# Patient Record
Sex: MALE | Race: BLACK OR AFRICAN AMERICAN | NOT HISPANIC OR LATINO | Employment: FULL TIME | URBAN - METROPOLITAN AREA
[De-identification: names, ages, dates, MRNs, and addresses within clinical notes are randomized per-mention and may not be internally consistent; named-entity substitution may affect disease eponyms.]

---

## 2021-06-18 ENCOUNTER — TELEPHONE (OUTPATIENT)
Dept: DIABETES | Facility: CLINIC | Age: 21
End: 2021-06-18

## 2021-08-17 ENCOUNTER — CLINICAL SUPPORT (OUTPATIENT)
Dept: DIABETES | Facility: CLINIC | Age: 21
End: 2021-08-17
Payer: MEDICAID

## 2021-08-17 DIAGNOSIS — E11.9 DIABETES MELLITUS, NEW ONSET: ICD-10-CM

## 2021-08-17 PROCEDURE — 99212 OFFICE O/P EST SF 10 MIN: CPT | Mod: PBBFAC,PN | Performed by: DIETITIAN, REGISTERED

## 2021-08-17 PROCEDURE — 99999 PR PBB SHADOW E&M-EST. PATIENT-LVL II: ICD-10-PCS | Mod: PBBFAC,,, | Performed by: DIETITIAN, REGISTERED

## 2021-08-17 PROCEDURE — G0108 DIAB MANAGE TRN  PER INDIV: HCPCS | Mod: PBBFAC,PN | Performed by: DIETITIAN, REGISTERED

## 2021-08-17 PROCEDURE — 99999 PR PBB SHADOW E&M-EST. PATIENT-LVL II: CPT | Mod: PBBFAC,,, | Performed by: DIETITIAN, REGISTERED

## 2021-08-26 ENCOUNTER — OFFICE VISIT (OUTPATIENT)
Dept: PRIMARY CARE CLINIC | Facility: CLINIC | Age: 21
End: 2021-08-26
Payer: MEDICAID

## 2021-08-26 VITALS
DIASTOLIC BLOOD PRESSURE: 64 MMHG | WEIGHT: 256.06 LBS | SYSTOLIC BLOOD PRESSURE: 144 MMHG | TEMPERATURE: 98 F | BODY MASS INDEX: 37.93 KG/M2 | HEIGHT: 69 IN | HEART RATE: 65 BPM | OXYGEN SATURATION: 100 %

## 2021-08-26 DIAGNOSIS — N48.89 IRRITATION OF PENIS: ICD-10-CM

## 2021-08-26 DIAGNOSIS — R03.0 ELEVATED BLOOD PRESSURE READING: ICD-10-CM

## 2021-08-26 DIAGNOSIS — E11.65 TYPE 2 DIABETES MELLITUS WITH HYPERGLYCEMIA, WITHOUT LONG-TERM CURRENT USE OF INSULIN: ICD-10-CM

## 2021-08-26 DIAGNOSIS — Z76.89 ENCOUNTER TO ESTABLISH CARE: Primary | ICD-10-CM

## 2021-08-26 DIAGNOSIS — E66.9 OBESITY (BMI 30-39.9): ICD-10-CM

## 2021-08-26 DIAGNOSIS — Z11.4 ENCOUNTER FOR SCREENING FOR HIV: ICD-10-CM

## 2021-08-26 DIAGNOSIS — Z11.59 NEED FOR HEPATITIS C SCREENING TEST: ICD-10-CM

## 2021-08-26 PROCEDURE — 99999 PR PBB SHADOW E&M-EST. PATIENT-LVL IV: CPT | Mod: PBBFAC,,, | Performed by: STUDENT IN AN ORGANIZED HEALTH CARE EDUCATION/TRAINING PROGRAM

## 2021-08-26 PROCEDURE — 99214 OFFICE O/P EST MOD 30 MIN: CPT | Mod: PBBFAC,PN | Performed by: STUDENT IN AN ORGANIZED HEALTH CARE EDUCATION/TRAINING PROGRAM

## 2021-08-26 PROCEDURE — 99999 PR PBB SHADOW E&M-EST. PATIENT-LVL IV: ICD-10-PCS | Mod: PBBFAC,,, | Performed by: STUDENT IN AN ORGANIZED HEALTH CARE EDUCATION/TRAINING PROGRAM

## 2021-08-26 PROCEDURE — 99204 PR OFFICE/OUTPT VISIT, NEW, LEVL IV, 45-59 MIN: ICD-10-PCS | Mod: S$PBB,,, | Performed by: STUDENT IN AN ORGANIZED HEALTH CARE EDUCATION/TRAINING PROGRAM

## 2021-08-26 PROCEDURE — 99204 OFFICE O/P NEW MOD 45 MIN: CPT | Mod: S$PBB,,, | Performed by: STUDENT IN AN ORGANIZED HEALTH CARE EDUCATION/TRAINING PROGRAM

## 2021-08-26 RX ORDER — INSULIN PUMP SYRINGE, 3 ML
EACH MISCELLANEOUS
Qty: 1 EACH | Refills: 0 | Status: SHIPPED | OUTPATIENT
Start: 2021-08-26 | End: 2022-04-22 | Stop reason: SDUPTHER

## 2021-08-26 RX ORDER — LANCETS
EACH MISCELLANEOUS
Qty: 100 EACH | Refills: 5 | Status: SHIPPED | OUTPATIENT
Start: 2021-08-26

## 2021-08-26 RX ORDER — METFORMIN HYDROCHLORIDE 500 MG/1
1000 TABLET ORAL
Qty: 180 TABLET | Refills: 3 | Status: SHIPPED | OUTPATIENT
Start: 2021-08-26 | End: 2023-01-11 | Stop reason: SDUPTHER

## 2021-08-26 RX ORDER — HYDROCORTISONE 25 MG/G
CREAM TOPICAL 2 TIMES DAILY
Qty: 20 G | Refills: 0 | Status: SHIPPED | OUTPATIENT
Start: 2021-08-26 | End: 2021-09-21 | Stop reason: SDUPTHER

## 2021-09-21 ENCOUNTER — OFFICE VISIT (OUTPATIENT)
Dept: PRIMARY CARE CLINIC | Facility: CLINIC | Age: 21
End: 2021-09-21
Payer: MEDICAID

## 2021-09-21 VITALS
OXYGEN SATURATION: 99 % | WEIGHT: 247.13 LBS | SYSTOLIC BLOOD PRESSURE: 138 MMHG | TEMPERATURE: 99 F | BODY MASS INDEX: 36.6 KG/M2 | DIASTOLIC BLOOD PRESSURE: 64 MMHG | HEIGHT: 69 IN | HEART RATE: 73 BPM

## 2021-09-21 DIAGNOSIS — E11.65 TYPE 2 DIABETES MELLITUS WITH HYPERGLYCEMIA, WITHOUT LONG-TERM CURRENT USE OF INSULIN: ICD-10-CM

## 2021-09-21 DIAGNOSIS — E78.5 HYPERLIPIDEMIA, UNSPECIFIED HYPERLIPIDEMIA TYPE: ICD-10-CM

## 2021-09-21 DIAGNOSIS — N48.89 IRRITATION OF PENIS: ICD-10-CM

## 2021-09-21 DIAGNOSIS — L30.9 DERMATITIS: Primary | ICD-10-CM

## 2021-09-21 DIAGNOSIS — E66.9 OBESITY (BMI 30-39.9): ICD-10-CM

## 2021-09-21 PROCEDURE — 99999 PR PBB SHADOW E&M-EST. PATIENT-LVL III: ICD-10-PCS | Mod: PBBFAC,,, | Performed by: STUDENT IN AN ORGANIZED HEALTH CARE EDUCATION/TRAINING PROGRAM

## 2021-09-21 PROCEDURE — 99214 OFFICE O/P EST MOD 30 MIN: CPT | Mod: S$PBB,,, | Performed by: STUDENT IN AN ORGANIZED HEALTH CARE EDUCATION/TRAINING PROGRAM

## 2021-09-21 PROCEDURE — 99999 PR PBB SHADOW E&M-EST. PATIENT-LVL III: CPT | Mod: PBBFAC,,, | Performed by: STUDENT IN AN ORGANIZED HEALTH CARE EDUCATION/TRAINING PROGRAM

## 2021-09-21 PROCEDURE — 99214 PR OFFICE/OUTPT VISIT, EST, LEVL IV, 30-39 MIN: ICD-10-PCS | Mod: S$PBB,,, | Performed by: STUDENT IN AN ORGANIZED HEALTH CARE EDUCATION/TRAINING PROGRAM

## 2021-09-21 PROCEDURE — 99213 OFFICE O/P EST LOW 20 MIN: CPT | Mod: PBBFAC,PN | Performed by: STUDENT IN AN ORGANIZED HEALTH CARE EDUCATION/TRAINING PROGRAM

## 2021-09-21 RX ORDER — HYDROCORTISONE 25 MG/G
CREAM TOPICAL 2 TIMES DAILY
Qty: 20 G | Refills: 0 | Status: SHIPPED | OUTPATIENT
Start: 2021-09-21 | End: 2021-09-21

## 2021-09-21 RX ORDER — LANCETS 33 GAUGE
EACH MISCELLANEOUS 2 TIMES DAILY
COMMUNITY
Start: 2021-08-26

## 2021-09-21 RX ORDER — CHLORHEXIDINE GLUCONATE 40 MG/ML
SOLUTION TOPICAL DAILY PRN
Qty: 237 ML | Refills: 0
Start: 2021-09-21 | End: 2022-04-22

## 2021-09-21 RX ORDER — HYDROCORTISONE 25 MG/G
CREAM TOPICAL
Qty: 60 G | Refills: 1 | Status: SHIPPED | OUTPATIENT
Start: 2021-09-21 | End: 2022-04-22

## 2021-09-21 RX ORDER — MUPIROCIN 20 MG/G
OINTMENT TOPICAL
Qty: 15 G | Refills: 0 | Status: SHIPPED | OUTPATIENT
Start: 2021-09-21 | End: 2022-04-22

## 2021-09-21 RX ORDER — ATORVASTATIN CALCIUM 10 MG/1
10 TABLET, FILM COATED ORAL DAILY
Qty: 90 TABLET | Refills: 3 | Status: SHIPPED | OUTPATIENT
Start: 2021-09-21 | End: 2022-08-11

## 2022-04-10 DIAGNOSIS — E11.65 TYPE 2 DIABETES MELLITUS WITH HYPERGLYCEMIA, WITHOUT LONG-TERM CURRENT USE OF INSULIN: ICD-10-CM

## 2022-04-10 NOTE — TELEPHONE ENCOUNTER
Refill Routing Note   Medication(s) are not appropriate for processing by [facility] for the following reason(s):      - Patient has been seen in the ED/Hospital since the last PCP visit    [facility] action(s):  Defer          Medication reconciliation completed: No     Appointments  past 12m or future 3m with PCP    Date Provider   Last Visit   9/21/2021 Yair Jessica MD   Next Visit   Visit date not found Yair Jessica MD   ED visits in past 90 days: 0        Note composed:1:06 PM 04/10/2022

## 2022-04-10 NOTE — TELEPHONE ENCOUNTER
No new care gaps identified.  Powered by [employer]. Reference number: [id number].   4/10/2022 4:12:44 AM CDT

## 2022-04-11 RX ORDER — EMPAGLIFLOZIN 10 MG/1
TABLET, FILM COATED ORAL
Qty: 90 TABLET | Refills: 1 | Status: SHIPPED | OUTPATIENT
Start: 2022-04-11 | End: 2022-04-22

## 2022-04-22 ENCOUNTER — OFFICE VISIT (OUTPATIENT)
Dept: PRIMARY CARE CLINIC | Facility: CLINIC | Age: 22
End: 2022-04-22
Payer: COMMERCIAL

## 2022-04-22 ENCOUNTER — TELEPHONE (OUTPATIENT)
Dept: PODIATRY | Facility: CLINIC | Age: 22
End: 2022-04-22
Payer: COMMERCIAL

## 2022-04-22 VITALS
TEMPERATURE: 98 F | SYSTOLIC BLOOD PRESSURE: 140 MMHG | BODY MASS INDEX: 37.84 KG/M2 | DIASTOLIC BLOOD PRESSURE: 70 MMHG | OXYGEN SATURATION: 97 % | RESPIRATION RATE: 16 BRPM | HEART RATE: 51 BPM | HEIGHT: 69 IN | WEIGHT: 255.5 LBS

## 2022-04-22 DIAGNOSIS — L60.0 INGROWN TOENAIL OF RIGHT FOOT: ICD-10-CM

## 2022-04-22 DIAGNOSIS — E11.65 TYPE 2 DIABETES MELLITUS WITH HYPERGLYCEMIA, WITHOUT LONG-TERM CURRENT USE OF INSULIN: Primary | ICD-10-CM

## 2022-04-22 DIAGNOSIS — I10 HYPERTENSION, UNSPECIFIED TYPE: ICD-10-CM

## 2022-04-22 PROCEDURE — 99999 PR PBB SHADOW E&M-EST. PATIENT-LVL V: CPT | Mod: PBBFAC,,, | Performed by: STUDENT IN AN ORGANIZED HEALTH CARE EDUCATION/TRAINING PROGRAM

## 2022-04-22 PROCEDURE — 99215 OFFICE O/P EST HI 40 MIN: CPT | Mod: PBBFAC,PN | Performed by: STUDENT IN AN ORGANIZED HEALTH CARE EDUCATION/TRAINING PROGRAM

## 2022-04-22 PROCEDURE — 3044F HG A1C LEVEL LT 7.0%: CPT | Mod: CPTII,S$GLB,, | Performed by: STUDENT IN AN ORGANIZED HEALTH CARE EDUCATION/TRAINING PROGRAM

## 2022-04-22 PROCEDURE — 1159F MED LIST DOCD IN RCRD: CPT | Mod: CPTII,S$GLB,, | Performed by: STUDENT IN AN ORGANIZED HEALTH CARE EDUCATION/TRAINING PROGRAM

## 2022-04-22 PROCEDURE — 3008F BODY MASS INDEX DOCD: CPT | Mod: CPTII,S$GLB,, | Performed by: STUDENT IN AN ORGANIZED HEALTH CARE EDUCATION/TRAINING PROGRAM

## 2022-04-22 PROCEDURE — 3044F PR MOST RECENT HEMOGLOBIN A1C LEVEL <7.0%: ICD-10-PCS | Mod: CPTII,S$GLB,, | Performed by: STUDENT IN AN ORGANIZED HEALTH CARE EDUCATION/TRAINING PROGRAM

## 2022-04-22 PROCEDURE — 3078F DIAST BP <80 MM HG: CPT | Mod: CPTII,S$GLB,, | Performed by: STUDENT IN AN ORGANIZED HEALTH CARE EDUCATION/TRAINING PROGRAM

## 2022-04-22 PROCEDURE — 1160F PR REVIEW ALL MEDS BY PRESCRIBER/CLIN PHARMACIST DOCUMENTED: ICD-10-PCS | Mod: CPTII,S$GLB,, | Performed by: STUDENT IN AN ORGANIZED HEALTH CARE EDUCATION/TRAINING PROGRAM

## 2022-04-22 PROCEDURE — 1160F RVW MEDS BY RX/DR IN RCRD: CPT | Mod: CPTII,S$GLB,, | Performed by: STUDENT IN AN ORGANIZED HEALTH CARE EDUCATION/TRAINING PROGRAM

## 2022-04-22 PROCEDURE — 4010F ACE/ARB THERAPY RXD/TAKEN: CPT | Mod: CPTII,S$GLB,, | Performed by: STUDENT IN AN ORGANIZED HEALTH CARE EDUCATION/TRAINING PROGRAM

## 2022-04-22 PROCEDURE — 99999 PR PBB SHADOW E&M-EST. PATIENT-LVL V: ICD-10-PCS | Mod: PBBFAC,,, | Performed by: STUDENT IN AN ORGANIZED HEALTH CARE EDUCATION/TRAINING PROGRAM

## 2022-04-22 PROCEDURE — 99214 PR OFFICE/OUTPT VISIT, EST, LEVL IV, 30-39 MIN: ICD-10-PCS | Mod: S$GLB,,, | Performed by: STUDENT IN AN ORGANIZED HEALTH CARE EDUCATION/TRAINING PROGRAM

## 2022-04-22 PROCEDURE — 3078F PR MOST RECENT DIASTOLIC BLOOD PRESSURE < 80 MM HG: ICD-10-PCS | Mod: CPTII,S$GLB,, | Performed by: STUDENT IN AN ORGANIZED HEALTH CARE EDUCATION/TRAINING PROGRAM

## 2022-04-22 PROCEDURE — 4010F PR ACE/ARB THEARPY RXD/TAKEN: ICD-10-PCS | Mod: CPTII,S$GLB,, | Performed by: STUDENT IN AN ORGANIZED HEALTH CARE EDUCATION/TRAINING PROGRAM

## 2022-04-22 PROCEDURE — 3077F PR MOST RECENT SYSTOLIC BLOOD PRESSURE >= 140 MM HG: ICD-10-PCS | Mod: CPTII,S$GLB,, | Performed by: STUDENT IN AN ORGANIZED HEALTH CARE EDUCATION/TRAINING PROGRAM

## 2022-04-22 PROCEDURE — 3008F PR BODY MASS INDEX (BMI) DOCUMENTED: ICD-10-PCS | Mod: CPTII,S$GLB,, | Performed by: STUDENT IN AN ORGANIZED HEALTH CARE EDUCATION/TRAINING PROGRAM

## 2022-04-22 PROCEDURE — 99214 OFFICE O/P EST MOD 30 MIN: CPT | Mod: S$GLB,,, | Performed by: STUDENT IN AN ORGANIZED HEALTH CARE EDUCATION/TRAINING PROGRAM

## 2022-04-22 PROCEDURE — 3077F SYST BP >= 140 MM HG: CPT | Mod: CPTII,S$GLB,, | Performed by: STUDENT IN AN ORGANIZED HEALTH CARE EDUCATION/TRAINING PROGRAM

## 2022-04-22 PROCEDURE — 1159F PR MEDICATION LIST DOCUMENTED IN MEDICAL RECORD: ICD-10-PCS | Mod: CPTII,S$GLB,, | Performed by: STUDENT IN AN ORGANIZED HEALTH CARE EDUCATION/TRAINING PROGRAM

## 2022-04-22 RX ORDER — INSULIN PUMP SYRINGE, 3 ML
EACH MISCELLANEOUS
Qty: 1 EACH | Refills: 0 | Status: SHIPPED | OUTPATIENT
Start: 2022-04-22 | End: 2023-04-22

## 2022-04-22 RX ORDER — CLOTRIMAZOLE AND BETAMETHASONE DIPROPIONATE 10; .64 MG/G; MG/G
CREAM TOPICAL 2 TIMES DAILY
COMMUNITY
Start: 2022-04-12 | End: 2022-08-11

## 2022-04-22 RX ORDER — LOSARTAN POTASSIUM 25 MG/1
25 TABLET ORAL DAILY
Qty: 90 TABLET | Refills: 3 | Status: SHIPPED | OUTPATIENT
Start: 2022-04-22 | End: 2022-08-11

## 2022-04-22 NOTE — PROGRESS NOTES
Subjective:           Patient ID: Teresa Albarado is a 22 y.o. male who presents today with a chief complaint of follow-up diabetes.    Chief Complaint:   No chief complaint on file.      History of Present Illness:    22-year-old male presenting to clinic today to follow-up on diabetes.    Patient currently taking metformin twice a day, not taking his Jardiance at this time.  Also states he does not have a glucometer currently.  At last appointment patient had mentioned that he did not have a glucometer due to the pharmacy being out of them.    Recurrent ingrown toenails.  Has occurred again on the right foot.   Tender to palpation, has been having his mom soak the foot.   Interested in seeing podiatry.     Review of Systems   Constitutional: Negative.  Negative for chills and fever.   HENT: Negative.  Negative for congestion, rhinorrhea, sinus pressure and sinus pain.    Eyes: Negative.    Respiratory: Negative.  Negative for chest tightness, shortness of breath and wheezing.    Cardiovascular: Negative.  Negative for chest pain, palpitations and leg swelling.   Gastrointestinal: Negative.  Negative for abdominal distention, constipation, diarrhea, nausea and vomiting.   Endocrine: Negative.  Negative for polydipsia and polyuria.   Genitourinary: Negative for difficulty urinating, frequency, penile pain and urgency. Penile discharge: itching.   Musculoskeletal: Negative.  Negative for arthralgias.   Skin: Positive for color change and rash.        Irritation to elbows and thighs with itchyness.   Allergic/Immunologic: Negative for food allergies.   Neurological: Positive for headaches. Negative for dizziness, light-headedness and numbness.   Psychiatric/Behavioral: Negative.  The patient is not nervous/anxious.            Objective:        Vitals:    04/22/22 0851 04/22/22 0956   BP: (!) 140/68 (!) 140/70   BP Location: Right arm Right arm   Patient Position: Sitting Sitting   BP Method: Large (Manual) Large  "(Manual)   Pulse: (!) 51    Resp: 16    Temp: 98.4 °F (36.9 °C)    TempSrc: Oral    SpO2: 97%    Weight: 115.9 kg (255 lb 8.2 oz)    Height: 5' 9" (1.753 m)        Body mass index is 37.73 kg/m².            Physical Exam  Vitals reviewed.   Constitutional:       General: He is not in acute distress.     Appearance: Normal appearance. He is not ill-appearing.      Comments: As per BMI.   HENT:      Head: Normocephalic and atraumatic.      Right Ear: External ear normal.      Left Ear: External ear normal.      Nose: Nose normal.   Eyes:      Extraocular Movements: Extraocular movements intact.      Conjunctiva/sclera: Conjunctivae normal.   Cardiovascular:      Rate and Rhythm: Normal rate.   Pulmonary:      Effort: Pulmonary effort is normal. No respiratory distress.   Musculoskeletal:         General: No swelling or deformity.      Cervical back: Normal range of motion.      Comments: DM foot exam reveals an ingrown right great toe nail as imaged.  Some local inflammation and irritation, but no surrounding cellulitis.      Patient sensation is intact to 8 points on feet bilaterally.  No ulcers.  Pulses intact.   Neurological:      General: No focal deficit present.      Mental Status: He is alert and oriented to person, place, and time.      Gait: Gait normal.   Psychiatric:         Mood and Affect: Mood normal.                 Lab Results   Component Value Date     03/11/2022    K 4.3 03/11/2022     03/11/2022    CO2 23 03/11/2022    BUN 14 03/11/2022    CREATININE 0.9 03/11/2022    ANIONGAP 9 03/11/2022     Lab Results   Component Value Date    HGBA1C 5.8 (H) 03/11/2022     No results found for: BNP, BNPTRIAGEBLO    Lab Results   Component Value Date    WBC 6.50 07/16/2021    HGB 16.0 07/16/2021    HCT 49.6 07/16/2021     07/16/2021    GRAN 3.1 07/16/2021    GRAN 47.0 07/16/2021     Lab Results   Component Value Date    CHOL 201 (H) 08/26/2021    HDL 39 (L) 08/26/2021    LDLCALC 147.4 " 08/26/2021    TRIG 73 08/26/2021          Current Outpatient Medications:     atorvastatin (LIPITOR) 10 MG tablet, Take 1 tablet (10 mg total) by mouth once daily., Disp: 90 tablet, Rfl: 3    blood sugar diagnostic Strp, To check BG 2 times daily, to use with insurance preferred meter, Disp: 100 strip, Rfl: 5    clotrimazole-betamethasone 1-0.05% (LOTRISONE) cream, Apply topically 2 (two) times daily., Disp: , Rfl:     lancets Misc, To check BG 2 times daily, to use with insurance preferred meter, Disp: 100 each, Rfl: 5    metFORMIN (GLUCOPHAGE) 500 MG tablet, Take 2 tablets (1,000 mg total) by mouth daily with breakfast. (Patient taking differently: Take 1,000 mg by mouth 2 (two) times daily with meals.), Disp: 180 tablet, Rfl: 3    TRUEPLUS LANCETS 33 gauge Misc, Apply topically 2 (two) times daily., Disp: , Rfl:     blood-glucose meter kit, To check BG 2 times daily, to use with insurance preferred meter, Disp: 1 each, Rfl: 0    losartan (COZAAR) 25 MG tablet, Take 1 tablet (25 mg total) by mouth once daily., Disp: 90 tablet, Rfl: 3     Outpatient Encounter Medications as of 4/22/2022   Medication Sig Dispense Refill    atorvastatin (LIPITOR) 10 MG tablet Take 1 tablet (10 mg total) by mouth once daily. 90 tablet 3    blood sugar diagnostic Strp To check BG 2 times daily, to use with insurance preferred meter 100 strip 5    clotrimazole-betamethasone 1-0.05% (LOTRISONE) cream Apply topically 2 (two) times daily.      lancets Misc To check BG 2 times daily, to use with insurance preferred meter 100 each 5    metFORMIN (GLUCOPHAGE) 500 MG tablet Take 2 tablets (1,000 mg total) by mouth daily with breakfast. (Patient taking differently: Take 1,000 mg by mouth 2 (two) times daily with meals.) 180 tablet 3    TRUEPLUS LANCETS 33 gauge Misc Apply topically 2 (two) times daily.      [DISCONTINUED] blood-glucose meter kit To check BG 2 times daily, to use with insurance preferred meter 1 each 0     blood-glucose meter kit To check BG 2 times daily, to use with insurance preferred meter 1 each 0    losartan (COZAAR) 25 MG tablet Take 1 tablet (25 mg total) by mouth once daily. 90 tablet 3    [DISCONTINUED] chlorhexidine (HIBICLENS) 4 % external liquid Apply topically daily as needed (skin irritation). 237 mL 0    [DISCONTINUED] hydrocortisone 2.5 % cream Daily to area of skin irritation for 1 week. 60 g 1    [DISCONTINUED] JARDIANCE 10 mg tablet TAKE 1 TABLET(10 MG) BY MOUTH EVERY DAY (Patient not taking: Reported on 4/22/2022) 90 tablet 1    [DISCONTINUED] mupirocin (BACTROBAN) 2 % ointment Apply to each nostril every other night for 3 doses. 15 g 0    [DISCONTINUED] triamcinolone acetonide 0.1% (KENALOG) 0.1 % cream Apply topically 3 (three) times daily. for 10 days 80 g 1     No facility-administered encounter medications on file as of 4/22/2022.          Assessment:       1. Type 2 diabetes mellitus with hyperglycemia, without long-term current use of insulin    2. Hypertension, unspecified type    3. Ingrown toenail of right foot           Plan:       Type 2 diabetes mellitus with hyperglycemia, without long-term current use of insulin  -     blood-glucose meter kit; To check BG 2 times daily, to use with insurance preferred meter  Dispense: 1 each; Refill: 0  -     Ambulatory referral/consult to Optometry; Future; Expected date: 04/29/2022    Hypertension, unspecified type  -     losartan (COZAAR) 25 MG tablet; Take 1 tablet (25 mg total) by mouth once daily.  Dispense: 90 tablet; Refill: 3    Ingrown toenail of right foot  -     Ambulatory referral/consult to Podiatry; Future; Expected date: 04/29/2022       DM:   - patient has been having difficulty getting a glucometer from the pharmacy, reordering got her today and will attempt to have pharmacy contacted to discuss how we can get this for him.   - patient's last A1c was down all the way to 5.8%, significant improvement from over 14%  approximately 1 year ago.   - would continue taking metformin twice a day.  Can stop Jardiance as patient has never truly started taking in any way.   - would continue eating a healthy diet and advised daily exercise.   - continue taking Atorvastatin (Lipitor) 10mg daily as well.    HTN:   - patient is a consistently elevated blood pressures at numerous previous appointments in hospital.   - today his blood pressure was only very slightly elevated, but do feel it would be appropriate to start a blood pressure medication as patient has been having headaches when his blood pressure is elevated and he is diabetic.   - starting losartan 25 mg 1 time daily    Ingrown Toenail:   - patient has ingrown toenail to the lateral aspect of right great toe.   - this is been a recurrent issue.  Does not appear need antibiotics at this time.   - can continue with Epsom salt rinses.  Referring to Podiatry for definitive treatment.  Would recommend destruction of the nail bed as patient has had recurrent issues and would like to have this not occur again.

## 2022-04-22 NOTE — PATIENT INSTRUCTIONS
DM:   - patient has been having difficulty getting a glucometer from the pharmacy, reordering got her today and will attempt to have pharmacy contacted to discuss how we can get this for him.   - patient's last A1c was down all the way to 5.8%, significant improvement from over 14% approximately 1 year ago.   - would continue taking metformin twice a day.  Can stop Jardiance as patient has never truly started taking in any way.   - would continue eating a healthy diet and advised daily exercise.   - continue taking Atorvastatin (Lipitor) 10mg daily as well.    HTN:   - patient is a consistently elevated blood pressures at numerous previous appointments in hospital.   - today his blood pressure was only very slightly elevated, but do feel it would be appropriate to start a blood pressure medication as patient has been having headaches when his blood pressure is elevated and he is diabetic.   - starting losartan 25 mg 1 time daily    Ingrown Toenail:   - patient has ingrown toenail to the lateral aspect of right great toe.   - this is been a recurrent issue.  Does not appear need antibiotics at this time.   - can continue with Epsom salt rinses.  Referring to Podiatry for definitive treatment.  Would recommend destruction of the nail bed as patient has had recurrent issues and would like to have this not occur again.

## 2022-04-22 NOTE — TELEPHONE ENCOUNTER
Spoke with patient. Stated his right great toe is swollen and possibly infected. Patient is a type 2 diabetic. Appointment has been established for patient for date/time selected by him. No further issues discussed.

## 2022-04-22 NOTE — TELEPHONE ENCOUNTER
----- Message from Navya Pack sent at 4/22/2022 10:12 AM CDT -----  Ingrown toenail of right foot [L60.0] patient has diabetes and has infected toe needs to be seen asap please call patient back heather schedule

## 2022-04-25 ENCOUNTER — OFFICE VISIT (OUTPATIENT)
Dept: PODIATRY | Facility: CLINIC | Age: 22
End: 2022-04-25
Payer: COMMERCIAL

## 2022-04-25 VITALS
TEMPERATURE: 98 F | SYSTOLIC BLOOD PRESSURE: 123 MMHG | WEIGHT: 251.63 LBS | DIASTOLIC BLOOD PRESSURE: 68 MMHG | BODY MASS INDEX: 37.27 KG/M2 | HEART RATE: 59 BPM | HEIGHT: 69 IN

## 2022-04-25 DIAGNOSIS — E66.9 OBESITY (BMI 30-39.9): ICD-10-CM

## 2022-04-25 DIAGNOSIS — L03.031 PARONYCHIA OF GREAT TOE OF RIGHT FOOT: ICD-10-CM

## 2022-04-25 DIAGNOSIS — L60.0 INGROWN TOENAIL OF RIGHT FOOT: ICD-10-CM

## 2022-04-25 DIAGNOSIS — M79.89 SWELLING OF TOE OF RIGHT FOOT: Primary | ICD-10-CM

## 2022-04-25 DIAGNOSIS — E11.65 TYPE 2 DIABETES MELLITUS WITH HYPERGLYCEMIA, WITHOUT LONG-TERM CURRENT USE OF INSULIN: ICD-10-CM

## 2022-04-25 DIAGNOSIS — L98.0 GRANULOMA, PYOGENIC: ICD-10-CM

## 2022-04-25 PROCEDURE — 11765 NAIL REMOVAL: ICD-10-PCS | Mod: T5,S$GLB,, | Performed by: PODIATRIST

## 2022-04-25 PROCEDURE — 3008F BODY MASS INDEX DOCD: CPT | Mod: CPTII,S$GLB,, | Performed by: PODIATRIST

## 2022-04-25 PROCEDURE — 99203 PR OFFICE/OUTPT VISIT, NEW, LEVL III, 30-44 MIN: ICD-10-PCS | Mod: 25,S$GLB,, | Performed by: PODIATRIST

## 2022-04-25 PROCEDURE — 99999 PR PBB SHADOW E&M-EST. PATIENT-LVL III: ICD-10-PCS | Mod: PBBFAC,,, | Performed by: PODIATRIST

## 2022-04-25 PROCEDURE — 3074F PR MOST RECENT SYSTOLIC BLOOD PRESSURE < 130 MM HG: ICD-10-PCS | Mod: CPTII,S$GLB,, | Performed by: PODIATRIST

## 2022-04-25 PROCEDURE — 1159F PR MEDICATION LIST DOCUMENTED IN MEDICAL RECORD: ICD-10-PCS | Mod: CPTII,S$GLB,, | Performed by: PODIATRIST

## 2022-04-25 PROCEDURE — 3078F DIAST BP <80 MM HG: CPT | Mod: CPTII,S$GLB,, | Performed by: PODIATRIST

## 2022-04-25 PROCEDURE — 99999 PR PBB SHADOW E&M-EST. PATIENT-LVL III: CPT | Mod: PBBFAC,,, | Performed by: PODIATRIST

## 2022-04-25 PROCEDURE — 3074F SYST BP LT 130 MM HG: CPT | Mod: CPTII,S$GLB,, | Performed by: PODIATRIST

## 2022-04-25 PROCEDURE — 3008F PR BODY MASS INDEX (BMI) DOCUMENTED: ICD-10-PCS | Mod: CPTII,S$GLB,, | Performed by: PODIATRIST

## 2022-04-25 PROCEDURE — 1159F MED LIST DOCD IN RCRD: CPT | Mod: CPTII,S$GLB,, | Performed by: PODIATRIST

## 2022-04-25 PROCEDURE — 3044F HG A1C LEVEL LT 7.0%: CPT | Mod: CPTII,S$GLB,, | Performed by: PODIATRIST

## 2022-04-25 PROCEDURE — 3078F PR MOST RECENT DIASTOLIC BLOOD PRESSURE < 80 MM HG: ICD-10-PCS | Mod: CPTII,S$GLB,, | Performed by: PODIATRIST

## 2022-04-25 PROCEDURE — 99203 OFFICE O/P NEW LOW 30 MIN: CPT | Mod: 25,S$GLB,, | Performed by: PODIATRIST

## 2022-04-25 PROCEDURE — 4010F PR ACE/ARB THEARPY RXD/TAKEN: ICD-10-PCS | Mod: CPTII,S$GLB,, | Performed by: PODIATRIST

## 2022-04-25 PROCEDURE — 3044F PR MOST RECENT HEMOGLOBIN A1C LEVEL <7.0%: ICD-10-PCS | Mod: CPTII,S$GLB,, | Performed by: PODIATRIST

## 2022-04-25 PROCEDURE — 4010F ACE/ARB THERAPY RXD/TAKEN: CPT | Mod: CPTII,S$GLB,, | Performed by: PODIATRIST

## 2022-04-25 PROCEDURE — 11765 WEDGE EXCISION SKN NAIL FOLD: CPT | Mod: T5,S$GLB,, | Performed by: PODIATRIST

## 2022-04-25 NOTE — LETTER
April 25, 2022      [place] [facility]  [address], [address]  [facility] [address]  Phone: [phone]  Fax: [phone]       Patient: eTresa Albarado   YOB: 2000  Date of Visit: 04/25/2022    To Whom It May Concern:    Eduardo Albaraod  was at [facility] on 04/25/2022. The patient may return to work on 04/27/2022 with no restrictions. If you have any questions or concerns, or if I can be of further assistance, please do not hesitate to contact me.    Sincerely,    Cynthia Sullivan DPM

## 2022-04-25 NOTE — PROCEDURES
Nail Removal    Date/Time: 4/25/2022 9:15 AM  Performed by: Cynthia Sullivan DPM  Authorized by: Cynthia Sullivan DPM     Consent Done?:  Yes (Verbal)  Location:     Location:  Right foot    Location detail:  Right big toe  Anesthesia:     Anesthesia:  Digital block    Local anesthetic:  Lidocaine 2% without epinephrine and bupivacaine 0.5% without epinephrine    Anesthetic total (ml):  3  Procedure Details:     Preparation:  Skin prepped with alcohol    Amount removed:  Partial    Side:  Lateral    Wedge excision of skin of nail fold: Yes      Nail bed sutured?: No      Nail matrix removed:  None    Dressing applied:  Dressing applied    Patient tolerance:  Patient tolerated the procedure well with no immediate complications

## 2022-04-25 NOTE — PROGRESS NOTES
Subjective:      Patient ID: Teresa Albarado is a 22 y.o. male.    Chief Complaint: Ingrown Toenail (No pain just swollen and drainage )    Teresa is a 22 y.o. male who presents to the clinic upon referral from Dr. Jessica  for evaluation and treatment of diabetic feet.   Teresa has a past medical history of Diabetes mellitus and Diabetes mellitus, type 2. Dx Dm 6/2021.  Patient Active Problem List   Diagnosis    Obesity (BMI 30-39.9)    Type 2 diabetes mellitus with hyperglycemia, without long-term current use of insulin     Patient relates complaints today consist of IGTN - no pain but swollen x 3 wks.    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 Heterozygous hemoglobin variants (HbS, HgC, etc)do  not significantly interfere with this assay.   However, presence of multiple variants may affect accuracy.     09/21/2021 6.6 (H) 4.0 - 5.6 % Final     Comment:     ADA Screening Guidelines:  5.7-6.4%  Consistent with prediabetes  >or=6.5%  Consistent with diabetes    High levels of fetal hemoglobin interfere with the HbA1C  assay. Heterozygous hemoglobin variants (HbS, HgC, etc)do  not significantly interfere with this assay.   However, presence of multiple variants may affect accuracy.     06/17/2021 >14.0 (H) 4.0 - 5.6 % Final     Comment:     ADA Screening Guidelines:  5.7-6.4%  Consistent with prediabetes  >or=6.5%  Consistent with diabetes    High levels of fetal hemoglobin interfere with the HbA1C  assay. Heterozygous hemoglobin variants (HbS, HgC, etc)do  not significantly interfere with this assay.   However, presence of multiple variants may affect  accuracy.        Objective:      Review of Systems   Constitutional: Negative for malaise/fatigue.   Cardiovascular: Negative for leg swelling.   Skin: Positive for color change, dry skin, nail changes and suspicious lesions.   Musculoskeletal: Negative for falls, joint pain and myalgias.   Neurological: Negative for focal weakness, numbness, paresthesias and weakness.   Psychiatric/Behavioral: The patient is nervous/anxious.      Physical Exam  Vitals reviewed.   Constitutional:       General: He is not in acute distress.     Appearance: He is well-developed. He is morbidly obese.   Cardiovascular:      Pulses: Normal pulses.           Dorsalis pedis pulses are 2+ on the right side.   Musculoskeletal:         General: No swelling, tenderness or signs of injury.   Feet:      Right foot:      Protective Sensation: 2 sites tested. 2 sites sensed.      Skin integrity: Erythema and dry skin ( peeling lateral nail border right hallux) present. No warmth.      Toenail Condition: Right toenails are long and ingrown.      Comments: Nails wide hypertrophic. Crypttosis with irregular nail border lateral aspect of right hallux.   Skin:     General: Skin is warm and dry.      Capillary Refill: Capillary refill takes less than 2 seconds.      Findings: Erythema and lesion (Pyogenic granuloma lateral right hallux nail border) present. No bruising.      Comments: Hypertrophy of ungual labia with localized erythema and granuloma lateral nail borders/nail fold right hallux.  No purulence.   Neurological:      Mental Status: He is alert and oriented to person, place, and time.      Sensory: No sensory deficit.      Motor: No weakness.      Gait: Gait normal.   Psychiatric:         Mood and Affect: Affect normal. Mood is anxious.         Behavior: Behavior normal. Behavior is cooperative.         Assessment:      Encounter Diagnoses   Name Primary?    Ingrown toenail of right foot     Type 2 diabetes mellitus with hyperglycemia,  without long-term current use of insulin     Paronychia of great toe of right foot     Granuloma, pyogenic     Swelling of toe of right foot Yes    Obesity (BMI 30-39.9)        Problem List Items Addressed This Visit        Endocrine    Obesity (BMI 30-39.9)    Current Assessment & Plan     BMI 37.15 kg/M2-comorbidity includes diabetes.  Follow with PCP.           Type 2 diabetes mellitus with hyperglycemia, without long-term current use of insulin      Other Visit Diagnoses     Swelling of toe of right foot    -  Primary    Ingrown toenail of right foot        Relevant Orders    Nail Removal    Paronychia of great toe of right foot        Relevant Orders    Nail Removal    Granuloma, pyogenic        Relevant Orders    Nail Removal         Plan:       Teresa was seen today for ingrown toenail.    Diagnoses and all orders for this visit:    Swelling of toe of right foot    Ingrown toenail of right foot  -     Ambulatory referral/consult to Podiatry  -     Nail Removal    Type 2 diabetes mellitus with hyperglycemia, without long-term current use of insulin    Paronychia of great toe of right foot  -     Nail Removal    Granuloma, pyogenic  -     Nail Removal    Obesity (BMI 30-39.9)    I counseled the patient on his conditions, their implications and medical management.    - Shoe inspection. Diabetic Foot Education. Patient reminded of the importance of blood sugar control to help prevent podiatric complications of diabetes. Patient instructed on proper foot hygeine. We discussed wearing proper shoe gear, never walking without protective shoe gear, annual diabetic foot exam, sooner p.r.n..      - With patient's permission, after the local hallux block, nail border right hallux lateral aspect was aggressively reduced and debrided to their soft tissue attachment mechanically. I debrided the offending nail border approximately 3 mm from its edge and carried the nail plate incision down at an angle in order to wedge  out the offending cryptotic portion of the nail plate in toto. The large granulomatous mass was also excised on the nail fold. The area was cleansed with alcohol.  A dry sterile compressive dressing was applied Patient tolerated the procedure well and related significant relief..  Patient may change the dressing daily and/or apply a Band-Aid until area healed over the course of the next week.

## 2022-08-11 ENCOUNTER — OFFICE VISIT (OUTPATIENT)
Dept: PRIMARY CARE CLINIC | Facility: CLINIC | Age: 22
End: 2022-08-11
Payer: COMMERCIAL

## 2022-08-11 VITALS
OXYGEN SATURATION: 97 % | BODY MASS INDEX: 39.29 KG/M2 | TEMPERATURE: 98 F | RESPIRATION RATE: 16 BRPM | HEART RATE: 52 BPM | DIASTOLIC BLOOD PRESSURE: 60 MMHG | HEIGHT: 69 IN | WEIGHT: 265.31 LBS | SYSTOLIC BLOOD PRESSURE: 120 MMHG

## 2022-08-11 DIAGNOSIS — E66.9 OBESITY (BMI 30-39.9): ICD-10-CM

## 2022-08-11 DIAGNOSIS — E78.5 HYPERLIPIDEMIA, UNSPECIFIED HYPERLIPIDEMIA TYPE: ICD-10-CM

## 2022-08-11 DIAGNOSIS — E11.65 TYPE 2 DIABETES MELLITUS WITH HYPERGLYCEMIA, WITHOUT LONG-TERM CURRENT USE OF INSULIN: Primary | ICD-10-CM

## 2022-08-11 PROCEDURE — 3008F BODY MASS INDEX DOCD: CPT | Mod: CPTII,S$GLB,, | Performed by: STUDENT IN AN ORGANIZED HEALTH CARE EDUCATION/TRAINING PROGRAM

## 2022-08-11 PROCEDURE — 3044F PR MOST RECENT HEMOGLOBIN A1C LEVEL <7.0%: ICD-10-PCS | Mod: CPTII,S$GLB,, | Performed by: STUDENT IN AN ORGANIZED HEALTH CARE EDUCATION/TRAINING PROGRAM

## 2022-08-11 PROCEDURE — 4010F ACE/ARB THERAPY RXD/TAKEN: CPT | Mod: CPTII,S$GLB,, | Performed by: STUDENT IN AN ORGANIZED HEALTH CARE EDUCATION/TRAINING PROGRAM

## 2022-08-11 PROCEDURE — 1160F PR REVIEW ALL MEDS BY PRESCRIBER/CLIN PHARMACIST DOCUMENTED: ICD-10-PCS | Mod: CPTII,S$GLB,, | Performed by: STUDENT IN AN ORGANIZED HEALTH CARE EDUCATION/TRAINING PROGRAM

## 2022-08-11 PROCEDURE — 1160F RVW MEDS BY RX/DR IN RCRD: CPT | Mod: CPTII,S$GLB,, | Performed by: STUDENT IN AN ORGANIZED HEALTH CARE EDUCATION/TRAINING PROGRAM

## 2022-08-11 PROCEDURE — 4010F PR ACE/ARB THEARPY RXD/TAKEN: ICD-10-PCS | Mod: CPTII,S$GLB,, | Performed by: STUDENT IN AN ORGANIZED HEALTH CARE EDUCATION/TRAINING PROGRAM

## 2022-08-11 PROCEDURE — 3074F SYST BP LT 130 MM HG: CPT | Mod: CPTII,S$GLB,, | Performed by: STUDENT IN AN ORGANIZED HEALTH CARE EDUCATION/TRAINING PROGRAM

## 2022-08-11 PROCEDURE — 1159F MED LIST DOCD IN RCRD: CPT | Mod: CPTII,S$GLB,, | Performed by: STUDENT IN AN ORGANIZED HEALTH CARE EDUCATION/TRAINING PROGRAM

## 2022-08-11 PROCEDURE — 99214 OFFICE O/P EST MOD 30 MIN: CPT | Mod: S$GLB,,, | Performed by: STUDENT IN AN ORGANIZED HEALTH CARE EDUCATION/TRAINING PROGRAM

## 2022-08-11 PROCEDURE — 3008F PR BODY MASS INDEX (BMI) DOCUMENTED: ICD-10-PCS | Mod: CPTII,S$GLB,, | Performed by: STUDENT IN AN ORGANIZED HEALTH CARE EDUCATION/TRAINING PROGRAM

## 2022-08-11 PROCEDURE — 3044F HG A1C LEVEL LT 7.0%: CPT | Mod: CPTII,S$GLB,, | Performed by: STUDENT IN AN ORGANIZED HEALTH CARE EDUCATION/TRAINING PROGRAM

## 2022-08-11 PROCEDURE — 99999 PR PBB SHADOW E&M-EST. PATIENT-LVL V: CPT | Mod: PBBFAC,,, | Performed by: STUDENT IN AN ORGANIZED HEALTH CARE EDUCATION/TRAINING PROGRAM

## 2022-08-11 PROCEDURE — 99999 PR PBB SHADOW E&M-EST. PATIENT-LVL V: ICD-10-PCS | Mod: PBBFAC,,, | Performed by: STUDENT IN AN ORGANIZED HEALTH CARE EDUCATION/TRAINING PROGRAM

## 2022-08-11 PROCEDURE — 3074F PR MOST RECENT SYSTOLIC BLOOD PRESSURE < 130 MM HG: ICD-10-PCS | Mod: CPTII,S$GLB,, | Performed by: STUDENT IN AN ORGANIZED HEALTH CARE EDUCATION/TRAINING PROGRAM

## 2022-08-11 PROCEDURE — 99214 PR OFFICE/OUTPT VISIT, EST, LEVL IV, 30-39 MIN: ICD-10-PCS | Mod: S$GLB,,, | Performed by: STUDENT IN AN ORGANIZED HEALTH CARE EDUCATION/TRAINING PROGRAM

## 2022-08-11 PROCEDURE — 3078F DIAST BP <80 MM HG: CPT | Mod: CPTII,S$GLB,, | Performed by: STUDENT IN AN ORGANIZED HEALTH CARE EDUCATION/TRAINING PROGRAM

## 2022-08-11 PROCEDURE — 1159F PR MEDICATION LIST DOCUMENTED IN MEDICAL RECORD: ICD-10-PCS | Mod: CPTII,S$GLB,, | Performed by: STUDENT IN AN ORGANIZED HEALTH CARE EDUCATION/TRAINING PROGRAM

## 2022-08-11 PROCEDURE — 3078F PR MOST RECENT DIASTOLIC BLOOD PRESSURE < 80 MM HG: ICD-10-PCS | Mod: CPTII,S$GLB,, | Performed by: STUDENT IN AN ORGANIZED HEALTH CARE EDUCATION/TRAINING PROGRAM

## 2022-08-11 RX ORDER — LOSARTAN POTASSIUM 50 MG/1
50 TABLET ORAL DAILY
COMMUNITY
Start: 2022-07-03 | End: 2023-02-13

## 2022-08-11 RX ORDER — EMPAGLIFLOZIN 10 MG/1
TABLET, FILM COATED ORAL
COMMUNITY
Start: 2022-07-06 | End: 2022-10-09

## 2022-08-11 NOTE — PATIENT INSTRUCTIONS
"   Type 2 diabetes:   - patient's A1c 1 year ago was over 14%. Then 8 months later his A1c had come down to 6.6%, then 5 months ago was down to 5.8%   - patient is taking metformin 1000 mg twice a day as well as starting Jardiance.   - will continue on his current meds.  Should get A1c recheck.   - continue to eat healthy diet.  Would like to see patient lose some weight.    Hyperlipidemia:   - LDL was 147 on last check.  Patient is diabetic to goal is LDL <100.   - was on Atorvastatin 10mg daily.    - had started statin but reports another provider told him he did not need it.  Seems this was Nephrology, tough patient was not 100% sure that was who told him to stop the med.    - advised he likely should be on statin, but will get nephrology records and see if they had a reason he should not be on a statin.     Obesity:  Weight Loss:   - Body mass index is 39.18 kg/m².   - Normal weight is BMI 18-25, Overweight 25-30, and Obesity is 30+.   - would recommend weight loss for improved overall health.   - recommended moderate weight change, 1-2lbs per weeks.   - focus on eating a healthy sustainable diet.  Use food diary.   - consider shaan such as "Lose It" or "Noom".   - avoid empty calories that you may use daily from items such as like soda, sweet tea, sugary coffee, ice cream or candy.  An occasional piece of birthday cake is not the cause of obesity, but a daily Frappaccino could be to blame.    - Exercise has many benefits (heart health, improved mood/energy, higher self esteem, less depression, greater strength/flexibility, better sleep, less stress/anxiety, improved immune system, stronger bones, improved cognition, fewer colds/asthma exacerbations), it also does help lose weight.  But weight loss from exercise is much less impactful than when a change in diet can achieve.  Exercise is highly encouraged, but diet change should be the primary tool used to lose weight.   "

## 2022-08-11 NOTE — PROGRESS NOTES
Subjective:           Patient ID: Teresa Albarado is a 22 y.o. male who presents today with a chief complaint of DM f/u.    Chief Complaint:   Follow-up (HTN & DM)      History of Present Illness:    23yo male presenting for f/u on DM.    Has been eating well.  Exercising regularly, jogging 3 times a week.    Home glucose has been .     Has been eating salads on M/W/Th.  Has been eating diet things as well because his aunts have DM as well.  At night does do ice cream at times.     States he has been taking his medications as directed.    Patient had seen Nephrology and had his losartan increased from 25 mg daily to 50 mg daily.  Patient also states they had told him to stop his statin.  Will obtain records to try and understand rationale before restarting statin.      Review of Systems   Constitutional: Negative.  Negative for chills and fever.   HENT: Negative.  Negative for congestion, rhinorrhea, sinus pressure and sinus pain.    Eyes: Negative.    Respiratory: Negative.  Negative for chest tightness, shortness of breath and wheezing.    Cardiovascular: Negative.  Negative for chest pain, palpitations and leg swelling.   Gastrointestinal: Negative.  Negative for abdominal distention, constipation, diarrhea, nausea and vomiting.   Endocrine: Negative.  Negative for polydipsia and polyuria.   Genitourinary: Negative for difficulty urinating, frequency, penile pain and urgency.   Musculoskeletal: Negative.  Negative for arthralgias.   Skin: Negative for color change and rash.   Allergic/Immunologic: Negative for food allergies.   Neurological: Positive for headaches. Negative for dizziness, light-headedness and numbness.   Psychiatric/Behavioral: Negative.  The patient is not nervous/anxious.            Objective:        Vitals:    08/11/22 1132   BP: 120/60   BP Location: Right arm   Patient Position: Sitting   BP Method: Large (Manual)   Pulse: (!) 52   Resp: 16   Temp: 98.3 °F (36.8 °C)   TempSrc: Oral  "  SpO2: 97%   Weight: 120.4 kg (265 lb 5.2 oz)   Height: 5' 9" (1.753 m)       Body mass index is 39.18 kg/m².      Physical Exam  Vitals reviewed.   Constitutional:       General: He is not in acute distress.     Appearance: Normal appearance. He is obese. He is not ill-appearing.      Comments: As per BMI.   HENT:      Head: Normocephalic and atraumatic.      Right Ear: External ear normal.      Left Ear: External ear normal.      Nose: Nose normal.   Eyes:      Extraocular Movements: Extraocular movements intact.      Conjunctiva/sclera: Conjunctivae normal.   Cardiovascular:      Rate and Rhythm: Normal rate.   Pulmonary:      Effort: Pulmonary effort is normal. No respiratory distress.   Musculoskeletal:         General: No swelling or deformity.      Cervical back: Normal range of motion.      Comments: Ingrown toenail is improved from last visit, but is looking to be at risk of becoming ingrown again.  May need to follow up with Podiatry if unable to keep this addressed sufficiently at home.   Neurological:      General: No focal deficit present.      Mental Status: He is alert and oriented to person, place, and time.      Gait: Gait normal.   Psychiatric:         Mood and Affect: Mood normal.             Lab Results   Component Value Date     03/11/2022    K 4.3 03/11/2022     03/11/2022    CO2 23 03/11/2022    BUN 14 03/11/2022    CREATININE 0.9 03/11/2022    ANIONGAP 9 03/11/2022     Lab Results   Component Value Date    HGBA1C 5.8 (H) 03/11/2022     No results found for: BNP, BNPTRIAGEBLO    Lab Results   Component Value Date    WBC 6.50 07/16/2021    HGB 16.0 07/16/2021    HCT 49.6 07/16/2021     07/16/2021    GRAN 3.1 07/16/2021    GRAN 47.0 07/16/2021     Lab Results   Component Value Date    CHOL 201 (H) 08/26/2021    HDL 39 (L) 08/26/2021    LDLCALC 147.4 08/26/2021    TRIG 73 08/26/2021          Current Outpatient Medications:     blood sugar diagnostic Strp, To check BG 2 times " daily, to use with insurance preferred meter, Disp: 100 strip, Rfl: 5    blood-glucose meter kit, To check BG 2 times daily, to use with insurance preferred meter, Disp: 1 each, Rfl: 0    JARDIANCE 10 mg tablet, Take by mouth., Disp: , Rfl:     lancets Misc, To check BG 2 times daily, to use with insurance preferred meter, Disp: 100 each, Rfl: 5    losartan (COZAAR) 50 MG tablet, Take 50 mg by mouth once daily., Disp: , Rfl:     metFORMIN (GLUCOPHAGE) 500 MG tablet, Take 2 tablets (1,000 mg total) by mouth daily with breakfast. (Patient taking differently: Take 1,000 mg by mouth 2 (two) times daily with meals.), Disp: 180 tablet, Rfl: 3    TRUEPLUS LANCETS 33 gauge Misc, Apply topically 2 (two) times daily., Disp: , Rfl:      Outpatient Encounter Medications as of 8/11/2022   Medication Sig Dispense Refill    blood sugar diagnostic Strp To check BG 2 times daily, to use with insurance preferred meter 100 strip 5    blood-glucose meter kit To check BG 2 times daily, to use with insurance preferred meter 1 each 0    JARDIANCE 10 mg tablet Take by mouth.      lancets Misc To check BG 2 times daily, to use with insurance preferred meter 100 each 5    losartan (COZAAR) 50 MG tablet Take 50 mg by mouth once daily.      metFORMIN (GLUCOPHAGE) 500 MG tablet Take 2 tablets (1,000 mg total) by mouth daily with breakfast. (Patient taking differently: Take 1,000 mg by mouth 2 (two) times daily with meals.) 180 tablet 3    TRUEPLUS LANCETS 33 gauge Misc Apply topically 2 (two) times daily.      [DISCONTINUED] atorvastatin (LIPITOR) 10 MG tablet Take 1 tablet (10 mg total) by mouth once daily. 90 tablet 3    [DISCONTINUED] clotrimazole-betamethasone 1-0.05% (LOTRISONE) cream Apply topically 2 (two) times daily.      [DISCONTINUED] losartan (COZAAR) 25 MG tablet Take 1 tablet (25 mg total) by mouth once daily. 90 tablet 3     No facility-administered encounter medications on file as of 8/11/2022.         "  Assessment:       1. Type 2 diabetes mellitus with hyperglycemia, without long-term current use of insulin    2. Obesity (BMI 30-39.9)    3. Hyperlipidemia, unspecified hyperlipidemia type           Plan:       Type 2 diabetes mellitus with hyperglycemia, without long-term current use of insulin  -     Hemoglobin A1C; Future; Expected date: 08/11/2022    Obesity (BMI 30-39.9)  -     Lipid Panel; Future; Expected date: 08/11/2022    Hyperlipidemia, unspecified hyperlipidemia type  -     Lipid Panel; Future; Expected date: 08/11/2022       Type 2 diabetes:   - patient's A1c 1 year ago was over 14%. Then 8 months later his A1c had come down to 6.6%, then 5 months ago was down to 5.8%   - patient is taking metformin 1000 mg twice a day as well as starting Jardiance.   - will continue on his current meds.  Should get A1c recheck.   - continue to eat healthy diet.  Would like to see patient lose some weight.   - patient states he did eye exam through work, unclear if that was adequate for DM screen, will try to get record then determin if needing DM eye screen.     Hyperlipidemia:   - LDL was 147 on last check.  Patient is diabetic to goal is LDL <100.   - was on Atorvastatin 10mg daily.    - had started statin but reports another provider told him he did not need it.  Seems this was Nephrology, tough patient was not 100% sure that was who told him to stop the med.    - advised he likely should be on statin, but will get nephrology records and see if they had a reason he should not be on a statin.     Obesity:  Weight Loss:   - Body mass index is 39.18 kg/m².   - Normal weight is BMI 18-25, Overweight 25-30, and Obesity is 30+.   - would recommend weight loss for improved overall health.   - recommended moderate weight change, 1-2lbs per weeks.   - focus on eating a healthy sustainable diet.  Use food diary.   - consider hsaan such as "Lose It" or "Noom".   - avoid empty calories that you may use daily from items such as " like soda, sweet tea, sugary coffee, ice cream or candy.  An occasional piece of birthday cake is not the cause of obesity, but a daily Frappaccino could be to blame.    - Exercise has many benefits (heart health, improved mood/energy, higher self esteem, less depression, greater strength/flexibility, better sleep, less stress/anxiety, improved immune system, stronger bones, improved cognition, fewer colds/asthma exacerbations), it also does help lose weight.  But weight loss from exercise is much less impactful than when a change in diet can achieve.  Exercise is highly encouraged, but diet change should be the primary tool used to lose weight.

## 2023-01-11 DIAGNOSIS — E11.65 TYPE 2 DIABETES MELLITUS WITH HYPERGLYCEMIA, WITHOUT LONG-TERM CURRENT USE OF INSULIN: ICD-10-CM

## 2023-01-11 RX ORDER — METFORMIN HYDROCHLORIDE 500 MG/1
1000 TABLET ORAL
Qty: 180 TABLET | Refills: 0 | Status: SHIPPED | OUTPATIENT
Start: 2023-01-11 | End: 2023-02-13 | Stop reason: SDUPTHER

## 2023-01-11 NOTE — TELEPHONE ENCOUNTER
No new care gaps identified.  [facility] Embedded Care Gaps. Reference number: [id number]. 1/11/2023   11:07:33 AM CST

## 2023-01-11 NOTE — TELEPHONE ENCOUNTER
Refill Decision Note   Teresa Albarado  is requesting a refill authorization.  Brief Assessment and Rationale for Refill:  Approve     Medication Therapy Plan:       Medication Reconciliation Completed: No   Comments:     No Care Gaps recommended.     Note composed:11:38 AM 01/11/2023

## 2023-01-11 NOTE — TELEPHONE ENCOUNTER
----- Message from India Olson sent at 1/11/2023 10:20 AM CST -----  Contact: pt [phone]  Pt is requesting a refill on his metFORMIN (GLUCOPHAGE) 500 MG tablet 180 tablet. Pt is using   [id number] - [place] - [address] [facility] [address] [place] [address]  Phone: [phone] Fax: [phone]        Thank you

## 2023-01-31 ENCOUNTER — PATIENT OUTREACH (OUTPATIENT)
Dept: ADMINISTRATIVE | Facility: HOSPITAL | Age: 23
End: 2023-01-31
Payer: COMMERCIAL

## 2023-01-31 NOTE — PROGRESS NOTES
Health Maintenance Due   Topic Date Due    Pneumococcal Vaccines (Age 0-64) (1 - PCV) 04/06/2004    Eye Exam  Never done    COVID-19 Vaccine (3 - Booster for Moderna series) 10/22/2021    Influenza Vaccine (1) 09/01/2022        Chart review done.    updated.   Immunizations reviewed & updated.   Care Everywhere updated.

## 2023-02-10 ENCOUNTER — TELEPHONE (OUTPATIENT)
Dept: PRIMARY CARE CLINIC | Facility: CLINIC | Age: 23
End: 2023-02-10
Payer: COMMERCIAL

## 2023-02-10 DIAGNOSIS — E11.65 TYPE 2 DIABETES MELLITUS WITH HYPERGLYCEMIA, WITHOUT LONG-TERM CURRENT USE OF INSULIN: Primary | ICD-10-CM

## 2023-02-13 ENCOUNTER — OFFICE VISIT (OUTPATIENT)
Dept: PRIMARY CARE CLINIC | Facility: CLINIC | Age: 23
End: 2023-02-13
Payer: MEDICAID

## 2023-02-13 ENCOUNTER — CLINICAL SUPPORT (OUTPATIENT)
Dept: PRIMARY CARE CLINIC | Facility: CLINIC | Age: 23
End: 2023-02-13
Attending: STUDENT IN AN ORGANIZED HEALTH CARE EDUCATION/TRAINING PROGRAM
Payer: MEDICAID

## 2023-02-13 VITALS
DIASTOLIC BLOOD PRESSURE: 76 MMHG | OXYGEN SATURATION: 98 % | SYSTOLIC BLOOD PRESSURE: 122 MMHG | HEIGHT: 69 IN | WEIGHT: 263.13 LBS | HEART RATE: 89 BPM | RESPIRATION RATE: 18 BRPM | BODY MASS INDEX: 38.97 KG/M2

## 2023-02-13 DIAGNOSIS — I10 HYPERTENSION, UNSPECIFIED TYPE: ICD-10-CM

## 2023-02-13 DIAGNOSIS — E78.5 HYPERLIPIDEMIA, UNSPECIFIED HYPERLIPIDEMIA TYPE: ICD-10-CM

## 2023-02-13 DIAGNOSIS — Z00.00 HEALTH CARE MAINTENANCE: ICD-10-CM

## 2023-02-13 DIAGNOSIS — E11.65 TYPE 2 DIABETES MELLITUS WITH HYPERGLYCEMIA, WITHOUT LONG-TERM CURRENT USE OF INSULIN: Primary | ICD-10-CM

## 2023-02-13 DIAGNOSIS — E11.65 TYPE 2 DIABETES MELLITUS WITH HYPERGLYCEMIA, WITHOUT LONG-TERM CURRENT USE OF INSULIN: ICD-10-CM

## 2023-02-13 DIAGNOSIS — E66.9 OBESITY (BMI 30-39.9): ICD-10-CM

## 2023-02-13 DIAGNOSIS — E11.3291 MILD NONPROLIFERATIVE DIABETIC RETINOPATHY OF RIGHT EYE ASSOCIATED WITH TYPE 2 DIABETES MELLITUS, MACULAR EDEMA PRESENCE UNSPECIFIED: Primary | ICD-10-CM

## 2023-02-13 PROBLEM — M20.40 HAMMER TOE: Status: ACTIVE | Noted: 2022-09-07

## 2023-02-13 PROCEDURE — 3008F PR BODY MASS INDEX (BMI) DOCUMENTED: ICD-10-PCS | Mod: CPTII,,, | Performed by: STUDENT IN AN ORGANIZED HEALTH CARE EDUCATION/TRAINING PROGRAM

## 2023-02-13 PROCEDURE — 92228 DIABETIC EYE SCREENING PHOTO: ICD-10-PCS | Mod: 26,S$PBB,, | Performed by: OPTOMETRIST

## 2023-02-13 PROCEDURE — 92228 IMG RTA DETC/MNTR DS PHY/QHP: CPT | Mod: 26,S$PBB,, | Performed by: OPTOMETRIST

## 2023-02-13 PROCEDURE — 3078F DIAST BP <80 MM HG: CPT | Mod: CPTII,,, | Performed by: STUDENT IN AN ORGANIZED HEALTH CARE EDUCATION/TRAINING PROGRAM

## 2023-02-13 PROCEDURE — 3078F PR MOST RECENT DIASTOLIC BLOOD PRESSURE < 80 MM HG: ICD-10-PCS | Mod: CPTII,,, | Performed by: STUDENT IN AN ORGANIZED HEALTH CARE EDUCATION/TRAINING PROGRAM

## 2023-02-13 PROCEDURE — 3008F BODY MASS INDEX DOCD: CPT | Mod: CPTII,,, | Performed by: STUDENT IN AN ORGANIZED HEALTH CARE EDUCATION/TRAINING PROGRAM

## 2023-02-13 PROCEDURE — 99999 PR PBB SHADOW E&M-EST. PATIENT-LVL III: ICD-10-PCS | Mod: PBBFAC,,, | Performed by: STUDENT IN AN ORGANIZED HEALTH CARE EDUCATION/TRAINING PROGRAM

## 2023-02-13 PROCEDURE — 1160F RVW MEDS BY RX/DR IN RCRD: CPT | Mod: CPTII,,, | Performed by: STUDENT IN AN ORGANIZED HEALTH CARE EDUCATION/TRAINING PROGRAM

## 2023-02-13 PROCEDURE — 2024F PR 7 FIELD PHOTOS WITH INTERP/ REVIEW: ICD-10-PCS | Mod: CPTII,,, | Performed by: STUDENT IN AN ORGANIZED HEALTH CARE EDUCATION/TRAINING PROGRAM

## 2023-02-13 PROCEDURE — 3074F SYST BP LT 130 MM HG: CPT | Mod: CPTII,,, | Performed by: STUDENT IN AN ORGANIZED HEALTH CARE EDUCATION/TRAINING PROGRAM

## 2023-02-13 PROCEDURE — 99213 OFFICE O/P EST LOW 20 MIN: CPT | Mod: PBBFAC,PN | Performed by: STUDENT IN AN ORGANIZED HEALTH CARE EDUCATION/TRAINING PROGRAM

## 2023-02-13 PROCEDURE — 92228 IMG RTA DETC/MNTR DS PHY/QHP: CPT | Mod: PBBFAC,59,PN

## 2023-02-13 PROCEDURE — 99395 PREV VISIT EST AGE 18-39: CPT | Mod: S$PBB,,, | Performed by: STUDENT IN AN ORGANIZED HEALTH CARE EDUCATION/TRAINING PROGRAM

## 2023-02-13 PROCEDURE — 1159F MED LIST DOCD IN RCRD: CPT | Mod: CPTII,,, | Performed by: STUDENT IN AN ORGANIZED HEALTH CARE EDUCATION/TRAINING PROGRAM

## 2023-02-13 PROCEDURE — 1159F PR MEDICATION LIST DOCUMENTED IN MEDICAL RECORD: ICD-10-PCS | Mod: CPTII,,, | Performed by: STUDENT IN AN ORGANIZED HEALTH CARE EDUCATION/TRAINING PROGRAM

## 2023-02-13 PROCEDURE — 3074F PR MOST RECENT SYSTOLIC BLOOD PRESSURE < 130 MM HG: ICD-10-PCS | Mod: CPTII,,, | Performed by: STUDENT IN AN ORGANIZED HEALTH CARE EDUCATION/TRAINING PROGRAM

## 2023-02-13 PROCEDURE — 1160F PR REVIEW ALL MEDS BY PRESCRIBER/CLIN PHARMACIST DOCUMENTED: ICD-10-PCS | Mod: CPTII,,, | Performed by: STUDENT IN AN ORGANIZED HEALTH CARE EDUCATION/TRAINING PROGRAM

## 2023-02-13 PROCEDURE — 4010F ACE/ARB THERAPY RXD/TAKEN: CPT | Mod: CPTII,,, | Performed by: STUDENT IN AN ORGANIZED HEALTH CARE EDUCATION/TRAINING PROGRAM

## 2023-02-13 PROCEDURE — 99999 PR PBB SHADOW E&M-EST. PATIENT-LVL III: CPT | Mod: PBBFAC,,, | Performed by: STUDENT IN AN ORGANIZED HEALTH CARE EDUCATION/TRAINING PROGRAM

## 2023-02-13 PROCEDURE — 2024F 7 FLD RTA PHOTO EVC RTNOPTHY: CPT | Mod: CPTII,,, | Performed by: STUDENT IN AN ORGANIZED HEALTH CARE EDUCATION/TRAINING PROGRAM

## 2023-02-13 PROCEDURE — 99395 PR PREVENTIVE VISIT,EST,18-39: ICD-10-PCS | Mod: S$PBB,,, | Performed by: STUDENT IN AN ORGANIZED HEALTH CARE EDUCATION/TRAINING PROGRAM

## 2023-02-13 PROCEDURE — 4010F PR ACE/ARB THEARPY RXD/TAKEN: ICD-10-PCS | Mod: CPTII,,, | Performed by: STUDENT IN AN ORGANIZED HEALTH CARE EDUCATION/TRAINING PROGRAM

## 2023-02-13 RX ORDER — METFORMIN HYDROCHLORIDE 500 MG/1
500 TABLET ORAL
Qty: 90 TABLET | Refills: 3 | Status: SHIPPED | OUTPATIENT
Start: 2023-02-13 | End: 2023-07-18 | Stop reason: SDUPTHER

## 2023-02-13 RX ORDER — LOSARTAN POTASSIUM 100 MG/1
100 TABLET ORAL DAILY
Qty: 90 TABLET | Refills: 3 | Status: SHIPPED | OUTPATIENT
Start: 2023-02-13 | End: 2023-07-18 | Stop reason: SDUPTHER

## 2023-02-13 RX ORDER — LOSARTAN POTASSIUM 100 MG/1
100 TABLET ORAL DAILY
COMMUNITY
Start: 2022-11-11 | End: 2023-02-13 | Stop reason: SDUPTHER

## 2023-02-13 NOTE — PROGRESS NOTES
Teresa Albarado is a 22 y.o. male here for a diabetic eye screening with non-dilated fundus photos per Dr. Yair Jessica.    Patient cooperative?: Yes  Small pupils?: Yes  Last eye exam: n/a    For exam results, see Encounter Report.

## 2023-02-13 NOTE — PROGRESS NOTES
"Subjective:           Patient ID: Teresa Albarado is a 22 y.o. male who presents today with a chief complaint of check up.    Chief Complaint:   6 month check up       History of Present Illness:    22-year-old male presenting for six-month checkup.    Currently taking losartan 100 mg daily, metformin 1000 mg once daily and Jardiance 10 mg daily.    States has seen nephrology.  Had losartan 75mg increased to 100mg last Fall.      States is doing well.  Takes meds as directed.    Checks glucose about once a week, most glucose checks are under 100, but denies hypoglycemic events.     Review of Systems   Constitutional: Negative.    HENT: Negative.     Eyes: Negative.    Respiratory: Negative.     Cardiovascular: Negative.    Gastrointestinal: Negative.    Endocrine: Negative.    Genitourinary: Negative.    Musculoskeletal: Negative.    Skin: Negative.    Allergic/Immunologic: Negative for food allergies.   Psychiatric/Behavioral: Negative.           Objective:        Vitals:    02/13/23 1123   BP: 122/76   BP Location: Right arm   Patient Position: Sitting   BP Method: Large (Manual)   Pulse: 89   Resp: 18   SpO2: 98%   Weight: 119.3 kg (263 lb 1.9 oz)   Height: 5' 9" (1.753 m)       Body mass index is 38.86 kg/m².      Physical Exam  Constitutional:       Appearance: Normal appearance. He is obese. He is not ill-appearing or toxic-appearing.      Comments: As per BMI.   HENT:      Head: Normocephalic and atraumatic.      Right Ear: External ear normal.      Left Ear: External ear normal.      Nose: No congestion.      Mouth/Throat:      Mouth: Mucous membranes are moist.      Pharynx: Oropharynx is clear.   Eyes:      Extraocular Movements: Extraocular movements intact.      Conjunctiva/sclera: Conjunctivae normal.   Cardiovascular:      Rate and Rhythm: Normal rate and regular rhythm.      Heart sounds: No murmur heard.  Pulmonary:      Effort: Pulmonary effort is normal. No respiratory distress.      Breath sounds: " No wheezing.   Abdominal:      General: Bowel sounds are normal.      Palpations: Abdomen is soft.      Tenderness: There is no right CVA tenderness or left CVA tenderness.   Musculoskeletal:         General: No swelling.      Cervical back: Normal range of motion.      Right lower leg: No edema.      Left lower leg: No edema.      Comments: Normal DM foot exam.   Skin:     General: Skin is warm.      Capillary Refill: Capillary refill takes less than 2 seconds.      Coloration: Skin is not jaundiced.   Neurological:      General: No focal deficit present.      Mental Status: He is alert and oriented to person, place, and time.      Motor: No weakness.   Psychiatric:         Mood and Affect: Mood normal.         Behavior: Behavior normal.           Lab Results   Component Value Date     11/08/2022    K 4.7 11/08/2022     11/08/2022    CO2 28 11/08/2022    BUN 15 11/08/2022    CREATININE 1.0 11/08/2022    ANIONGAP 6 (L) 11/08/2022     Lab Results   Component Value Date    HGBA1C 5.6 08/12/2022     No results found for: BNP, BNPTRIAGEBLO    Lab Results   Component Value Date    WBC 7.82 11/08/2022    HGB 15.6 11/08/2022    HCT 48.1 11/08/2022     11/08/2022    GRAN 4.3 11/08/2022    GRAN 54.7 11/08/2022     Lab Results   Component Value Date    CHOL 186 08/12/2022    HDL 36 (L) 08/12/2022    LDLCALC 133.4 08/12/2022    TRIG 83 08/12/2022          Current Outpatient Medications:     blood sugar diagnostic Strp, To check BG 2 times daily, to use with insurance preferred meter, Disp: 100 strip, Rfl: 5    blood-glucose meter kit, To check BG 2 times daily, to use with insurance preferred meter, Disp: 1 each, Rfl: 0    lancets Misc, To check BG 2 times daily, to use with insurance preferred meter, Disp: 100 each, Rfl: 5    TRUEPLUS LANCETS 33 gauge Misc, Apply topically 2 (two) times daily., Disp: , Rfl:     empagliflozin (JARDIANCE) 10 mg tablet, Take 1 tablet (10 mg total) by mouth once daily., Disp:  90 tablet, Rfl: 3    losartan (COZAAR) 100 MG tablet, Take 1 tablet (100 mg total) by mouth once daily., Disp: 90 tablet, Rfl: 3    metFORMIN (GLUCOPHAGE) 500 MG tablet, Take 1 tablet (500 mg total) by mouth daily with breakfast., Disp: 90 tablet, Rfl: 3     Outpatient Encounter Medications as of 2/13/2023   Medication Sig Dispense Refill    blood sugar diagnostic Strp To check BG 2 times daily, to use with insurance preferred meter 100 strip 5    blood-glucose meter kit To check BG 2 times daily, to use with insurance preferred meter 1 each 0    lancets Misc To check BG 2 times daily, to use with insurance preferred meter 100 each 5    TRUEPLUS LANCETS 33 gauge Misc Apply topically 2 (two) times daily.      [DISCONTINUED] JARDIANCE 10 mg tablet TAKE 1 TABLET(10 MG) BY MOUTH EVERY DAY 90 tablet 0    [DISCONTINUED] losartan (COZAAR) 100 MG tablet Take 100 mg by mouth once daily.      [DISCONTINUED] metFORMIN (GLUCOPHAGE) 500 MG tablet Take 2 tablets (1,000 mg total) by mouth daily with breakfast. 180 tablet 0    empagliflozin (JARDIANCE) 10 mg tablet Take 1 tablet (10 mg total) by mouth once daily. 90 tablet 3    losartan (COZAAR) 100 MG tablet Take 1 tablet (100 mg total) by mouth once daily. 90 tablet 3    metFORMIN (GLUCOPHAGE) 500 MG tablet Take 1 tablet (500 mg total) by mouth daily with breakfast. 90 tablet 3    [DISCONTINUED] atorvastatin (LIPITOR) 10 MG tablet TAKE 1 TABLET(10 MG) BY MOUTH EVERY DAY 90 tablet 3    [DISCONTINUED] losartan (COZAAR) 50 MG tablet Take 50 mg by mouth once daily.       No facility-administered encounter medications on file as of 2/13/2023.          Assessment:       1. Type 2 diabetes mellitus with hyperglycemia, without long-term current use of insulin    2. Obesity (BMI 30-39.9)    3. Hypertension, unspecified type    4. Hyperlipidemia, unspecified hyperlipidemia type           Plan:       Type 2 diabetes mellitus with hyperglycemia, without long-term current use of insulin  -      metFORMIN (GLUCOPHAGE) 500 MG tablet; Take 1 tablet (500 mg total) by mouth daily with breakfast.  Dispense: 90 tablet; Refill: 3  -     empagliflozin (JARDIANCE) 10 mg tablet; Take 1 tablet (10 mg total) by mouth once daily.  Dispense: 90 tablet; Refill: 3  -     Hemoglobin A1C; Future; Expected date: 02/13/2023  -     Microalbumin/Creatinine Ratio, Urine; Future; Expected date: 02/13/2023  -     TSH; Future; Expected date: 02/13/2023  -     Foot Exam Performed    Obesity (BMI 30-39.9)  -     Hemoglobin A1C; Future; Expected date: 02/13/2023  -     Microalbumin/Creatinine Ratio, Urine; Future; Expected date: 02/13/2023  -     TSH; Future; Expected date: 02/13/2023    Hypertension, unspecified type  -     losartan (COZAAR) 100 MG tablet; Take 1 tablet (100 mg total) by mouth once daily.  Dispense: 90 tablet; Refill: 3    Hyperlipidemia, unspecified hyperlipidemia type                 Diabetes:   - patient presenting for diabetic appointment.   - patient has been taking metformin 1000 mg in the morning as directed, also taking Jardiance 10 mg daily.   - states he has seen Nephrology, was advised that he should keep his weight under 270 by Nephrology.   - will have patient get diabetic eye exam today.   - last A1c was 6 months ago, A1c was 5.6%.  States he frequently sees his glucose under 100 when checking.  Checks about once a week.    - Denies any dizziness lightheadedness or syncopal events.  - cutting back Metformin 1000mg daily to 500mg daily.  - continue Jardiance 10mg daily.      Obesity:   - BMI 38, would advise weight loss.   - continue on Jardiance 10mg daily.   - watch diet.     Weight Loss:   - Body mass index is 38.86 kg/m².   - Normal weight is BMI 18-25, Overweight 25-30, and Obesity is 30+.   - would recommend weight loss for improved overall health.   - recommended moderate weight change, 1-2lbs per weeks.   - focus on eating a healthy sustainable diet.  Use food diary.   - consider shaan such as  ""Lose It" or "Noom".   - avoid empty calories that you may use daily from items such as like soda, sweet tea, sugary coffee, ice cream or candy.  An occasional piece of birthday cake is not the cause of obesity, but a daily Frappaccino could be to blame.    - Exercise has many benefits (heart health, improved mood/energy, higher self esteem, less depression, greater strength/flexibility, better sleep, less stress/anxiety, improved immune system, stronger bones, improved cognition, fewer colds/asthma exacerbations), it also does help lose weight.  But weight loss from exercise is much less impactful than when a change in diet can achieve.  Exercise is highly encouraged, but diet change should be the primary tool used to lose weight.     Health Matinance:   - getting A1c, had lipids 6 months ago.     Vaccine:   - declines Flu and PCV at this time.     "

## 2023-02-13 NOTE — PATIENT INSTRUCTIONS
"    Diabetes:   - patient presenting for diabetic appointment.   - patient has been taking metformin 1000 mg in the morning as directed, also taking Jardiance 10 mg daily.   - states he has seen Nephrology, was advised that he should keep his weight under 270 by Nephrology.   - will have patient get diabetic eye exam today.   - last A1c was 6 months ago, A1c was 5.6%.  States he frequently sees his glucose under 100 when checking.  Checks about once a week.    - Denies any dizziness lightheadedness or syncopal events.  - cutting back Metformin 1000mg daily to 500mg daily.  - continue Jardiance 10mg daily.      Obesity:   - BMI 38, would advise weight loss.   - continue on Jardiance 10mg daily.   - watch diet.     Weight Loss:   - Body mass index is 38.86 kg/m².   - Normal weight is BMI 18-25, Overweight 25-30, and Obesity is 30+.   - would recommend weight loss for improved overall health.   - recommended moderate weight change, 1-2lbs per weeks.   - focus on eating a healthy sustainable diet.  Use food diary.   - consider shaan such as "Lose It" or "Noom".   - avoid empty calories that you may use daily from items such as like soda, sweet tea, sugary coffee, ice cream or candy.  An occasional piece of birthday cake is not the cause of obesity, but a daily Frappaccino could be to blame.    - Exercise has many benefits (heart health, improved mood/energy, higher self esteem, less depression, greater strength/flexibility, better sleep, less stress/anxiety, improved immune system, stronger bones, improved cognition, fewer colds/asthma exacerbations), it also does help lose weight.  But weight loss from exercise is much less impactful than when a change in diet can achieve.  Exercise is highly encouraged, but diet change should be the primary tool used to lose weight.     "

## 2023-07-18 DIAGNOSIS — E11.65 TYPE 2 DIABETES MELLITUS WITH HYPERGLYCEMIA, WITHOUT LONG-TERM CURRENT USE OF INSULIN: ICD-10-CM

## 2023-07-18 DIAGNOSIS — I10 HYPERTENSION, UNSPECIFIED TYPE: ICD-10-CM

## 2023-07-18 RX ORDER — METFORMIN HYDROCHLORIDE 500 MG/1
500 TABLET ORAL
Qty: 90 TABLET | Refills: 0 | Status: SHIPPED | OUTPATIENT
Start: 2023-07-18 | End: 2023-08-14 | Stop reason: SDUPTHER

## 2023-07-18 RX ORDER — LOSARTAN POTASSIUM 100 MG/1
100 TABLET ORAL DAILY
Qty: 90 TABLET | Refills: 0 | Status: SHIPPED | OUTPATIENT
Start: 2023-07-18 | End: 2023-08-14 | Stop reason: SDUPTHER

## 2023-07-18 NOTE — TELEPHONE ENCOUNTER
Care Due:                  Date            Visit Type   Department     Provider  --------------------------------------------------------------------------------                                EP -                              PRIMARY [facility]  Last Visit: 02-      CARE ([facility])   PRIMARY [facility]   [facility]  Next Visit: 08-      CARE ([facility])   PRIMARY CARE   Yair Jessica                                                            Last  Test          Frequency    Reason                     Performed    Due Date  --------------------------------------------------------------------------------    HBA1C.......  6 months...  empagliflozin, metFORMIN.  02- 08-    [facility] Embedded Care Due Messages. Reference number: [id number].   7/18/2023 3:00:59 PM CDT

## 2023-07-18 NOTE — TELEPHONE ENCOUNTER
----- Message from Nikosjelani Irvin sent at 7/18/2023 11:05 AM CDT -----  Contact: self [phone]  Requesting an RX refill or new RX.  Is this a refill or new RX: refill  RX name and strength (copy/paste from chart):  empagliflozin (JARDIANCE) 10 mg tablet  Is this a 30 day or 90 day RX:   Pharmacy name and phone # (copy/paste from chart):    [id number] - [place] [address] [facility] [address] [place] [address]  Phone: [phone] Fax: [phone]      The doctors have asked that we provide their patients with the following 2 reminders -- prescription refills can take up to 72 hours, and a friendly reminder that in the future you can use your [facility] account to request refills: yes    Requesting an RX refill or new RX.  Is this a refill or new RX: refill  RX name and strength (copy/paste from chart):  losartan (COZAAR) 100 MG tablet  Is this a 30 day or 90 day RX:   Pharmacy name and phone # (copy/paste from chart):    [id number] - [place] [address] AT [facility] [address] [place] [address]  Phone: [phone] Fax: [phone]      The doctors have asked that we provide their patients with the following 2 reminders -- prescription refills can take up to 72 hours, and a friendly reminder that in the future you can use your [facility] account to request refills: yes    Requesting an RX refill or new RX.  Is this a refill or new RX: refill  RX name and strength (copy/paste from chart):  metFORMIN (GLUCOPHAGE) 500 MG tablet  Is this a 30 day or 90 day RX:   Pharmacy name and phone # (copy/paste from chart):    [id number] - [place] [address] AT [facility] [address] [place] [address]  Phone: [phone] Fax: [phone]      The doctors have asked that we provide their patients with the following 2  reminders -- prescription refills can take up to 72 hours, and a friendly reminder that in the future you can use your [facility] account to request refills: yes    Please call and advise

## 2023-08-14 ENCOUNTER — OFFICE VISIT (OUTPATIENT)
Dept: PRIMARY CARE CLINIC | Facility: CLINIC | Age: 23
End: 2023-08-14
Payer: MEDICAID

## 2023-08-14 VITALS
TEMPERATURE: 98 F | HEART RATE: 65 BPM | DIASTOLIC BLOOD PRESSURE: 66 MMHG | HEIGHT: 69 IN | SYSTOLIC BLOOD PRESSURE: 124 MMHG | WEIGHT: 260.94 LBS | BODY MASS INDEX: 38.65 KG/M2 | OXYGEN SATURATION: 97 % | RESPIRATION RATE: 16 BRPM

## 2023-08-14 DIAGNOSIS — I10 HYPERTENSION, UNSPECIFIED TYPE: ICD-10-CM

## 2023-08-14 DIAGNOSIS — E66.9 OBESITY (BMI 30-39.9): ICD-10-CM

## 2023-08-14 DIAGNOSIS — E11.65 TYPE 2 DIABETES MELLITUS WITH HYPERGLYCEMIA, WITHOUT LONG-TERM CURRENT USE OF INSULIN: Primary | ICD-10-CM

## 2023-08-14 DIAGNOSIS — Z00.00 ANNUAL PHYSICAL EXAM: ICD-10-CM

## 2023-08-14 PROCEDURE — 3008F BODY MASS INDEX DOCD: CPT | Mod: CPTII,,, | Performed by: STUDENT IN AN ORGANIZED HEALTH CARE EDUCATION/TRAINING PROGRAM

## 2023-08-14 PROCEDURE — 3008F PR BODY MASS INDEX (BMI) DOCUMENTED: ICD-10-PCS | Mod: CPTII,,, | Performed by: STUDENT IN AN ORGANIZED HEALTH CARE EDUCATION/TRAINING PROGRAM

## 2023-08-14 PROCEDURE — 1159F PR MEDICATION LIST DOCUMENTED IN MEDICAL RECORD: ICD-10-PCS | Mod: CPTII,,, | Performed by: STUDENT IN AN ORGANIZED HEALTH CARE EDUCATION/TRAINING PROGRAM

## 2023-08-14 PROCEDURE — 3078F PR MOST RECENT DIASTOLIC BLOOD PRESSURE < 80 MM HG: ICD-10-PCS | Mod: CPTII,,, | Performed by: STUDENT IN AN ORGANIZED HEALTH CARE EDUCATION/TRAINING PROGRAM

## 2023-08-14 PROCEDURE — 3061F NEG MICROALBUMINURIA REV: CPT | Mod: CPTII,,, | Performed by: STUDENT IN AN ORGANIZED HEALTH CARE EDUCATION/TRAINING PROGRAM

## 2023-08-14 PROCEDURE — 3074F SYST BP LT 130 MM HG: CPT | Mod: CPTII,,, | Performed by: STUDENT IN AN ORGANIZED HEALTH CARE EDUCATION/TRAINING PROGRAM

## 2023-08-14 PROCEDURE — 99214 OFFICE O/P EST MOD 30 MIN: CPT | Mod: S$PBB,,, | Performed by: STUDENT IN AN ORGANIZED HEALTH CARE EDUCATION/TRAINING PROGRAM

## 2023-08-14 PROCEDURE — 3066F PR DOCUMENTATION OF TREATMENT FOR NEPHROPATHY: ICD-10-PCS | Mod: CPTII,,, | Performed by: STUDENT IN AN ORGANIZED HEALTH CARE EDUCATION/TRAINING PROGRAM

## 2023-08-14 PROCEDURE — 4010F PR ACE/ARB THEARPY RXD/TAKEN: ICD-10-PCS | Mod: CPTII,,, | Performed by: STUDENT IN AN ORGANIZED HEALTH CARE EDUCATION/TRAINING PROGRAM

## 2023-08-14 PROCEDURE — 3066F NEPHROPATHY DOC TX: CPT | Mod: CPTII,,, | Performed by: STUDENT IN AN ORGANIZED HEALTH CARE EDUCATION/TRAINING PROGRAM

## 2023-08-14 PROCEDURE — 99999 PR PBB SHADOW E&M-EST. PATIENT-LVL IV: CPT | Mod: PBBFAC,,, | Performed by: STUDENT IN AN ORGANIZED HEALTH CARE EDUCATION/TRAINING PROGRAM

## 2023-08-14 PROCEDURE — 3044F HG A1C LEVEL LT 7.0%: CPT | Mod: CPTII,,, | Performed by: STUDENT IN AN ORGANIZED HEALTH CARE EDUCATION/TRAINING PROGRAM

## 2023-08-14 PROCEDURE — 99999 PR PBB SHADOW E&M-EST. PATIENT-LVL IV: ICD-10-PCS | Mod: PBBFAC,,, | Performed by: STUDENT IN AN ORGANIZED HEALTH CARE EDUCATION/TRAINING PROGRAM

## 2023-08-14 PROCEDURE — 4010F ACE/ARB THERAPY RXD/TAKEN: CPT | Mod: CPTII,,, | Performed by: STUDENT IN AN ORGANIZED HEALTH CARE EDUCATION/TRAINING PROGRAM

## 2023-08-14 PROCEDURE — 1160F RVW MEDS BY RX/DR IN RCRD: CPT | Mod: CPTII,,, | Performed by: STUDENT IN AN ORGANIZED HEALTH CARE EDUCATION/TRAINING PROGRAM

## 2023-08-14 PROCEDURE — 3044F PR MOST RECENT HEMOGLOBIN A1C LEVEL <7.0%: ICD-10-PCS | Mod: CPTII,,, | Performed by: STUDENT IN AN ORGANIZED HEALTH CARE EDUCATION/TRAINING PROGRAM

## 2023-08-14 PROCEDURE — 3078F DIAST BP <80 MM HG: CPT | Mod: CPTII,,, | Performed by: STUDENT IN AN ORGANIZED HEALTH CARE EDUCATION/TRAINING PROGRAM

## 2023-08-14 PROCEDURE — 1159F MED LIST DOCD IN RCRD: CPT | Mod: CPTII,,, | Performed by: STUDENT IN AN ORGANIZED HEALTH CARE EDUCATION/TRAINING PROGRAM

## 2023-08-14 PROCEDURE — 99214 OFFICE O/P EST MOD 30 MIN: CPT | Mod: PBBFAC,PN | Performed by: STUDENT IN AN ORGANIZED HEALTH CARE EDUCATION/TRAINING PROGRAM

## 2023-08-14 PROCEDURE — 3061F PR NEG MICROALBUMINURIA RESULT DOCUMENTED/REVIEW: ICD-10-PCS | Mod: CPTII,,, | Performed by: STUDENT IN AN ORGANIZED HEALTH CARE EDUCATION/TRAINING PROGRAM

## 2023-08-14 PROCEDURE — 3074F PR MOST RECENT SYSTOLIC BLOOD PRESSURE < 130 MM HG: ICD-10-PCS | Mod: CPTII,,, | Performed by: STUDENT IN AN ORGANIZED HEALTH CARE EDUCATION/TRAINING PROGRAM

## 2023-08-14 PROCEDURE — 99214 PR OFFICE/OUTPT VISIT, EST, LEVL IV, 30-39 MIN: ICD-10-PCS | Mod: S$PBB,,, | Performed by: STUDENT IN AN ORGANIZED HEALTH CARE EDUCATION/TRAINING PROGRAM

## 2023-08-14 PROCEDURE — 1160F PR REVIEW ALL MEDS BY PRESCRIBER/CLIN PHARMACIST DOCUMENTED: ICD-10-PCS | Mod: CPTII,,, | Performed by: STUDENT IN AN ORGANIZED HEALTH CARE EDUCATION/TRAINING PROGRAM

## 2023-08-14 RX ORDER — METFORMIN HYDROCHLORIDE 500 MG/1
500 TABLET ORAL
Qty: 90 TABLET | Refills: 3 | Status: SHIPPED | OUTPATIENT
Start: 2023-08-14

## 2023-08-14 RX ORDER — LOSARTAN POTASSIUM 100 MG/1
100 TABLET ORAL DAILY
Qty: 90 TABLET | Refills: 3 | Status: SHIPPED | OUTPATIENT
Start: 2023-08-14

## 2023-08-14 NOTE — PROGRESS NOTES
"Subjective:           Patient ID: Teresa Albarado is a 23 y.o. male who presents today with a chief complaint of DM.    Chief Complaint:   Follow-up (DM)      History of Present Illness:    24yo male presenting for DM f/u appt.    DM:  Is eating well with family.  States every time he checks his glucose it is really good.  Now only checking about 1 times weekly as it's never high or low.  Is taking the Metformin 500mg daily and Jardiance 10mg daily.   Needs med refills today.     HTN:  Is taking Losartan 100mg daily and tolerating well.       Review of Systems   Constitutional: Negative.  Negative for fever.   HENT: Negative.  Negative for congestion, sinus pressure and sinus pain.    Eyes: Negative.    Respiratory: Negative.  Negative for cough, shortness of breath and wheezing.    Cardiovascular: Negative.  Negative for chest pain, palpitations and leg swelling.   Gastrointestinal: Negative.  Negative for constipation, diarrhea, nausea and vomiting.   Endocrine: Negative.    Genitourinary: Negative.  Negative for difficulty urinating, frequency and urgency.   Musculoskeletal: Negative.  Negative for arthralgias and gait problem.   Skin: Negative.    Allergic/Immunologic: Negative for food allergies.   Neurological:  Negative for weakness and headaches.   Psychiatric/Behavioral: Negative.  Negative for sleep disturbance.            Objective:        Vitals:    08/14/23 0824   BP: 124/66   BP Location: Right arm   Patient Position: Sitting   BP Method: Medium (Manual)   Pulse: 65   Resp: 16   Temp: 97.9 °F (36.6 °C)   TempSrc: Temporal   SpO2: 97%   Weight: 118.3 kg (260 lb 14.6 oz)   Height: 5' 9" (1.753 m)       Body mass index is 38.53 kg/m².      Physical Exam  Constitutional:       Appearance: Normal appearance. He is obese. He is not ill-appearing or toxic-appearing.      Comments: As per BMI.   HENT:      Head: Normocephalic and atraumatic.      Right Ear: External ear normal.      Left Ear: External ear " "normal.      Nose: No congestion.      Mouth/Throat:      Mouth: Mucous membranes are moist.      Pharynx: Oropharynx is clear.   Eyes:      Extraocular Movements: Extraocular movements intact.      Conjunctiva/sclera: Conjunctivae normal.   Cardiovascular:      Rate and Rhythm: Normal rate and regular rhythm.      Heart sounds: No murmur heard.  Pulmonary:      Effort: Pulmonary effort is normal. No respiratory distress.      Breath sounds: No wheezing.   Abdominal:      General: Bowel sounds are normal.      Palpations: Abdomen is soft.      Tenderness: There is no right CVA tenderness or left CVA tenderness.   Musculoskeletal:         General: No swelling.      Cervical back: Normal range of motion.      Right lower leg: No edema.      Left lower leg: No edema.      Comments: Normal DM foot exam.   Skin:     General: Skin is warm.      Capillary Refill: Capillary refill takes less than 2 seconds.      Coloration: Skin is not jaundiced.   Neurological:      General: No focal deficit present.      Mental Status: He is alert and oriented to person, place, and time.      Motor: No weakness.   Psychiatric:         Mood and Affect: Mood normal.         Behavior: Behavior normal.             Lab Results   Component Value Date     05/19/2023    K 4.2 05/19/2023     05/19/2023    CO2 23 05/19/2023    BUN 17 05/19/2023    CREATININE 1.1 05/19/2023    ANIONGAP 9 05/19/2023     Lab Results   Component Value Date    HGBA1C 5.6 02/13/2023     No results found for: "BNP", "BNPTRIAGEBLO"    Lab Results   Component Value Date    WBC 5.88 05/19/2023    HGB 15.7 05/19/2023    HCT 48.9 05/19/2023     05/19/2023    GRAN 3.3 05/19/2023    GRAN 55.6 05/19/2023     Lab Results   Component Value Date    CHOL 186 08/12/2022    HDL 36 (L) 08/12/2022    LDLCALC 133.4 08/12/2022    TRIG 83 08/12/2022          Current Outpatient Medications:     blood sugar diagnostic Strp, To check BG 2 times daily, to use with insurance " preferred meter, Disp: 100 strip, Rfl: 5    lancets Misc, To check BG 2 times daily, to use with insurance preferred meter, Disp: 100 each, Rfl: 5    TRUEPLUS LANCETS 33 gauge Misc, Apply topically 2 (two) times daily., Disp: , Rfl:     blood-glucose meter kit, To check BG 2 times daily, to use with insurance preferred meter, Disp: 1 each, Rfl: 0    empagliflozin (JARDIANCE) 10 mg tablet, Take 1 tablet (10 mg total) by mouth once daily., Disp: 90 tablet, Rfl: 3    losartan (COZAAR) 100 MG tablet, Take 1 tablet (100 mg total) by mouth once daily., Disp: 90 tablet, Rfl: 3    metFORMIN (GLUCOPHAGE) 500 MG tablet, Take 1 tablet (500 mg total) by mouth daily with breakfast., Disp: 90 tablet, Rfl: 3     Outpatient Encounter Medications as of 8/14/2023   Medication Sig Dispense Refill    blood sugar diagnostic Strp To check BG 2 times daily, to use with insurance preferred meter 100 strip 5    lancets Misc To check BG 2 times daily, to use with insurance preferred meter 100 each 5    TRUEPLUS LANCETS 33 gauge Misc Apply topically 2 (two) times daily.      [DISCONTINUED] empagliflozin (JARDIANCE) 10 mg tablet Take 1 tablet (10 mg total) by mouth once daily. 90 tablet 0    [DISCONTINUED] losartan (COZAAR) 100 MG tablet Take 1 tablet (100 mg total) by mouth once daily. 90 tablet 0    [DISCONTINUED] metFORMIN (GLUCOPHAGE) 500 MG tablet Take 1 tablet (500 mg total) by mouth daily with breakfast. 90 tablet 0    blood-glucose meter kit To check BG 2 times daily, to use with insurance preferred meter 1 each 0    empagliflozin (JARDIANCE) 10 mg tablet Take 1 tablet (10 mg total) by mouth once daily. 90 tablet 3    losartan (COZAAR) 100 MG tablet Take 1 tablet (100 mg total) by mouth once daily. 90 tablet 3    metFORMIN (GLUCOPHAGE) 500 MG tablet Take 1 tablet (500 mg total) by mouth daily with breakfast. 90 tablet 3    [DISCONTINUED] empagliflozin (JARDIANCE) 10 mg tablet Take 1 tablet (10 mg total) by mouth once daily. 90 tablet 3     [DISCONTINUED] losartan (COZAAR) 100 MG tablet Take 1 tablet (100 mg total) by mouth once daily. 90 tablet 3    [DISCONTINUED] metFORMIN (GLUCOPHAGE) 500 MG tablet Take 1 tablet (500 mg total) by mouth daily with breakfast. 90 tablet 3     No facility-administered encounter medications on file as of 8/14/2023.          Assessment:       1. Type 2 diabetes mellitus with hyperglycemia, without long-term current use of insulin    2. Hypertension, unspecified type    3. Obesity (BMI 30-39.9)    4. Annual physical exam           Plan:       Type 2 diabetes mellitus with hyperglycemia, without long-term current use of insulin  -     metFORMIN (GLUCOPHAGE) 500 MG tablet; Take 1 tablet (500 mg total) by mouth daily with breakfast.  Dispense: 90 tablet; Refill: 3  -     empagliflozin (JARDIANCE) 10 mg tablet; Take 1 tablet (10 mg total) by mouth once daily.  Dispense: 90 tablet; Refill: 3  -     Lipid Panel; Future; Expected date: 08/14/2023  -     Hemoglobin A1C; Future; Expected date: 08/14/2023  -     TSH; Future; Expected date: 08/14/2023    Hypertension, unspecified type  -     losartan (COZAAR) 100 MG tablet; Take 1 tablet (100 mg total) by mouth once daily.  Dispense: 90 tablet; Refill: 3  -     Lipid Panel; Future; Expected date: 08/14/2023  -     Hemoglobin A1C; Future; Expected date: 08/14/2023  -     TSH; Future; Expected date: 08/14/2023    Obesity (BMI 30-39.9)  -     Lipid Panel; Future; Expected date: 08/14/2023  -     Hemoglobin A1C; Future; Expected date: 08/14/2023  -     TSH; Future; Expected date: 08/14/2023    Annual physical exam  -     Lipid Panel; Future; Expected date: 08/14/2023  -     Hemoglobin A1C; Future; Expected date: 08/14/2023  -     TSH; Future; Expected date: 08/14/2023               DM:  - Is eating well with family.  - States every time he checks his glucose it is really good.  Now only checking about 1 times weekly as it's never high or low.  - Is taking the Metformin 500mg daily and  Jardiance 10mg daily.   - Last A1c was 5.6%  - Needs med refills today.   - has been very active at work.  - discussed possible cutting down from 2 meds to 1 med if A1c remains under 5.7%.    HTN:  - Is taking Losartan 100mg daily and tolerating well.

## 2023-08-14 NOTE — PATIENT INSTRUCTIONS
DM:  - Is eating well with family.  - States every time he checks his glucose it is really good.  Now only checking about 1 times weekly as it's never high or low.  - Is taking the Metformin 500mg daily and Jardiance 10mg daily.   - Last A1c was 5.6%  - Needs med refills today.   - has been very active at work.  - discussed possible cutting down from 2 meds to 1 med if A1c remains under 5.7%.    HTN:  - Is taking Losartan 100mg daily and tolerating well.

## 2024-11-05 RX ORDER — ATORVASTATIN CALCIUM 10 MG/1
10 TABLET, FILM COATED ORAL DAILY
COMMUNITY

## 2024-12-04 ENCOUNTER — PATIENT OUTREACH (OUTPATIENT)
Dept: ADMINISTRATIVE | Facility: HOSPITAL | Age: 24
End: 2024-12-04
Payer: MEDICAID

## 2024-12-04 DIAGNOSIS — E11.65 TYPE 2 DIABETES MELLITUS WITH HYPERGLYCEMIA, WITHOUT LONG-TERM CURRENT USE OF INSULIN: Primary | ICD-10-CM

## 2024-12-04 NOTE — PROGRESS NOTES
Health Maintenance Due   Topic Date Due    Pneumococcal Vaccines (Age 0-64) (1 of 2 - PCV) 04/06/2004    Diabetes Urine Screening  02/13/2024    Foot Exam  02/13/2024    Eye Exam  02/13/2024    Hemoglobin A1c  02/14/2024    Lipid Panel  08/14/2024    Influenza Vaccine (1) 09/01/2024    COVID-19 Vaccine (3 - 2024-25 season) 09/01/2024    TETANUS VACCINE  11/12/2024     Immunizations - reviewed and updated   Care Everywhere - triggered   Care Teams - updated   Outreach - Pre visit chart review done. Patient has an appointment with PCP on 12/6/2024 for annual   Orders placed for diabetic eye photo and microalbumin. Added to appointment notes to make staff aware

## 2024-12-06 ENCOUNTER — CLINICAL SUPPORT (OUTPATIENT)
Dept: PRIMARY CARE CLINIC | Facility: CLINIC | Age: 24
End: 2024-12-06
Attending: STUDENT IN AN ORGANIZED HEALTH CARE EDUCATION/TRAINING PROGRAM
Payer: MEDICAID

## 2024-12-06 ENCOUNTER — OFFICE VISIT (OUTPATIENT)
Dept: PRIMARY CARE CLINIC | Facility: CLINIC | Age: 24
End: 2024-12-06
Payer: MEDICAID

## 2024-12-06 VITALS
HEIGHT: 68 IN | BODY MASS INDEX: 43.66 KG/M2 | WEIGHT: 288.06 LBS | OXYGEN SATURATION: 98 % | DIASTOLIC BLOOD PRESSURE: 88 MMHG | HEART RATE: 63 BPM | RESPIRATION RATE: 19 BRPM | SYSTOLIC BLOOD PRESSURE: 128 MMHG

## 2024-12-06 DIAGNOSIS — Z79.899 OTHER LONG TERM (CURRENT) DRUG THERAPY: ICD-10-CM

## 2024-12-06 DIAGNOSIS — E11.65 TYPE 2 DIABETES MELLITUS WITH HYPERGLYCEMIA, WITHOUT LONG-TERM CURRENT USE OF INSULIN: ICD-10-CM

## 2024-12-06 DIAGNOSIS — Z00.00 ANNUAL PHYSICAL EXAM: Primary | ICD-10-CM

## 2024-12-06 DIAGNOSIS — I10 HYPERTENSION, UNSPECIFIED TYPE: ICD-10-CM

## 2024-12-06 PROCEDURE — 99999 PR PBB SHADOW E&M-EST. PATIENT-LVL III: CPT | Mod: PBBFAC,,, | Performed by: STUDENT IN AN ORGANIZED HEALTH CARE EDUCATION/TRAINING PROGRAM

## 2024-12-06 PROCEDURE — 99213 OFFICE O/P EST LOW 20 MIN: CPT | Mod: PBBFAC,PN | Performed by: STUDENT IN AN ORGANIZED HEALTH CARE EDUCATION/TRAINING PROGRAM

## 2024-12-06 RX ORDER — SEMAGLUTIDE 1.34 MG/ML
1 INJECTION, SOLUTION SUBCUTANEOUS
Qty: 3 ML | Refills: 11 | Status: SHIPPED | OUTPATIENT
Start: 2025-02-18 | End: 2026-02-18

## 2024-12-06 RX ORDER — SEMAGLUTIDE 0.68 MG/ML
INJECTION, SOLUTION SUBCUTANEOUS
Qty: 6 ML | Refills: 0 | Status: SHIPPED | OUTPATIENT
Start: 2024-12-06 | End: 2025-02-14

## 2024-12-06 NOTE — PROGRESS NOTES
Teresa Albarado is a 24 y.o. male here for a diabetic eye screening with non-dilated fundus photos per Dr. Jessica.    Patient cooperative?: Yes  Small pupils?: No  Last eye exam: <1 year    For exam results, see Encounter Report.

## 2024-12-06 NOTE — PROGRESS NOTES
Assessment:       1. Annual physical exam    2. Type 2 diabetes mellitus with hyperglycemia, without long-term current use of insulin    3. Hypertension, unspecified type    4. BMI 40.0-44.9, adult    5. Other long term (current) drug therapy           Plan:     Assessment & Plan    IMPRESSION:  - A1C improved significantly from over 11 3 years ago to 5.6 more recently  - Current blood pressure 128/88, considered acceptable  - No current symptoms of cardiac issues or neuropathy  - Will start Ozempic for weight management and diabetes control  - Patient qualifies for Ozempic coverage as a diabetic medication  - No family history of thyroid cancer or pancreatitis, reducing risk factors for Ozempic use  - Planned gradual dose escalation: 0.25mg for 4 weeks, then 0.5mg, eventually to 1mg    TYPE 2 DIABETES MANAGEMENT:  - Explained Ozempic mechanism: helps metabolize sugars, slows GI tract, reduces appetite.  - Demonstrated Ozempic pen use, including needle size and injection technique.  - Discussed approved injection sites: around belly button or upper thigh.  - Explained potential initial side effects: nausea, bloating in first days of treatment.  - Discussed importance of balanced diet with adequate protein while on Ozempic.  - Educated on average weight loss expectations and variability in individual responses.  - Explained body's metabolic response to dietary changes and how Ozempic may help.  - Teresa to maintain protein intake while reducing overall calorie consumption.  - Recommend remaining relatively active to support weight loss efforts.  - Started Ozempic for diabetes management and weight loss: Initial dose: 0.25mg once weekly for 28 days (4 injections); Increase to 0.5mg once weekly for next 6 injections; Plan to increase to 1mg once weekly as maintenance dose (blue pen).  - Discontinued Metformin.  - A1C test ordered.    MEDICATION CHANGES:  - Discontinued blood pressure medication.    FOLLOW-UP:  -  Follow up in 6-8 weeks to assess tolerance of 0.5mg Ozempic dose.  - Follow up on February 18th as scheduled to reassess and potentially adjust to 1mg dose.             Annual physical exam    Type 2 diabetes mellitus with hyperglycemia, without long-term current use of insulin  -     semaglutide (OZEMPIC) 0.25 mg or 0.5 mg (2 mg/3 mL) pen injector; Inject 0.25 mg into the skin every 7 days for 28 days, THEN 0.5 mg every 7 days.  Dispense: 6 mL; Refill: 0  -     semaglutide (OZEMPIC) 1 mg/dose (4 mg/3 mL); Inject 1 mg into the skin every 7 days.  Dispense: 3 mL; Refill: 11  -     CBC Auto Differential; Future; Expected date: 12/06/2024  -     Comprehensive Metabolic Panel; Future; Expected date: 12/06/2024  -     Lipid Panel; Future; Expected date: 12/06/2024  -     Hemoglobin A1C; Future; Expected date: 12/06/2024  -     TSH; Future; Expected date: 12/06/2024    Hypertension, unspecified type    BMI 40.0-44.9, adult  -     semaglutide (OZEMPIC) 0.25 mg or 0.5 mg (2 mg/3 mL) pen injector; Inject 0.25 mg into the skin every 7 days for 28 days, THEN 0.5 mg every 7 days.  Dispense: 6 mL; Refill: 0  -     semaglutide (OZEMPIC) 1 mg/dose (4 mg/3 mL); Inject 1 mg into the skin every 7 days.  Dispense: 3 mL; Refill: 11  -     CBC Auto Differential; Future; Expected date: 12/06/2024  -     Comprehensive Metabolic Panel; Future; Expected date: 12/06/2024  -     Lipid Panel; Future; Expected date: 12/06/2024  -     Hemoglobin A1C; Future; Expected date: 12/06/2024  -     TSH; Future; Expected date: 12/06/2024    Other long term (current) drug therapy  -     CBC Auto Differential; Future; Expected date: 12/06/2024  -     Comprehensive Metabolic Panel; Future; Expected date: 12/06/2024  -     Lipid Panel; Future; Expected date: 12/06/2024  -     Hemoglobin A1C; Future; Expected date: 12/06/2024  -     TSH; Future; Expected date: 12/06/2024                This note was generated with the assistance of ambient listening  technology. Verbal consent was obtained by the patient and accompanying visitor(s) for the recording of patient appointment to facilitate this note. I attest to having reviewed and edited the generated note for accuracy, though some syntax or spelling errors may persist. Please contact the author of this note for any clarification.      Subjective:           Patient ID: Teresa Albarado   Age:  24 y.o.  Sex: male     Chief Complaint:   Annual Exam and Follow-up      History of Present Illness:    Teresa Albarado is a 24 y.o. male who presents today with a chief complaint of Annual Exam and Follow-up  .    History of Present Illness    CHIEF COMPLAINT:  Teresa presents today for follow-up and weight management.    WEIGHT MANAGEMENT:  He expresses a strong desire to lose weight and reports frustration with his current weight gain pattern. Despite efforts to reduce food intake, he only manages to lose about 5 lbs before regaining. He indicates fatigue with the ongoing struggle to maintain weight loss.    DIABETES:  He has a history of diabetes. His A1C was significantly elevated, reaching over 11 three years ago. Recently, there has been a notable improvement with the most recent measurement at 5.6. He reports discontinuing all diabetes medications, including Metformin, attributing the improvement in blood sugar control to dietary changes, specifically eliminating all sweets from his diet.    HYPERTENSION:  He reports a history of hypertension and was previously on medication. He is no longer taking any blood pressure medication, and current readings appear to be within normal range.    CURRENT SYMPTOMS:  He denies experiencing shortness of breath, chest pain, palpitations, numbness or tingling in his hands and feet, and swelling in his legs.    FAMILY HISTORY:  He denies family history of thyroid cancer or pancreatitis.      ROS:  General: +fatigue, +weight loss  Cardiovascular: -chest pain, -palpitations  Respiratory:  "-shortness of breath  Neurological: -numbness           Review of Systems   Constitutional: Negative.  Negative for fever.   HENT: Negative.  Negative for congestion, sinus pressure and sinus pain.    Eyes: Negative.    Respiratory: Negative.  Negative for cough, shortness of breath and wheezing.    Cardiovascular: Negative.  Negative for chest pain, palpitations and leg swelling.   Gastrointestinal: Negative.  Negative for constipation, diarrhea, nausea and vomiting.   Endocrine: Negative.    Genitourinary: Negative.  Negative for difficulty urinating, frequency and urgency.   Musculoskeletal: Negative.  Negative for arthralgias and gait problem.   Skin: Negative.    Allergic/Immunologic: Negative for food allergies.   Neurological:  Negative for weakness and headaches.   Psychiatric/Behavioral: Negative.  Negative for sleep disturbance.            Objective:        Vitals:    12/06/24 1323   BP: 128/88   Pulse: 63   Resp: 19   SpO2: 98%   Weight: 130.7 kg (288 lb 0.5 oz)   Height: 5' 8" (1.727 m)       Body mass index is 43.8 kg/m².      Physical Exam  Constitutional:       Appearance: Normal appearance. He is obese. He is not ill-appearing or toxic-appearing.      Comments: As per BMI.   HENT:      Head: Normocephalic and atraumatic.      Right Ear: External ear normal.      Left Ear: External ear normal.      Nose: No congestion.      Mouth/Throat:      Mouth: Mucous membranes are moist.      Pharynx: Oropharynx is clear.   Eyes:      Extraocular Movements: Extraocular movements intact.      Conjunctiva/sclera: Conjunctivae normal.   Cardiovascular:      Rate and Rhythm: Normal rate and regular rhythm.      Heart sounds: No murmur heard.  Pulmonary:      Effort: Pulmonary effort is normal. No respiratory distress.      Breath sounds: No wheezing.   Abdominal:      General: Bowel sounds are normal.      Palpations: Abdomen is soft.      Tenderness: There is no right CVA tenderness or left CVA tenderness. " "  Musculoskeletal:         General: No swelling.      Cervical back: Normal range of motion.      Right lower leg: No edema.      Left lower leg: No edema.      Comments: Normal DM foot exam.   Skin:     General: Skin is warm.      Capillary Refill: Capillary refill takes less than 2 seconds.      Coloration: Skin is not jaundiced.   Neurological:      General: No focal deficit present.      Mental Status: He is alert and oriented to person, place, and time.      Motor: No weakness.   Psychiatric:         Mood and Affect: Mood normal.         Behavior: Behavior normal.         Physical Exam    Vitals: Blood pressure: 128/88. Heart rate: 63 bpm. Weight: 288 lbs.  Cardiovascular: Bilateral radial pulses present. No murmur. Slight variability in heart rate with respiration.               Past Medical History:   Diagnosis Date    Diabetes mellitus     Diabetes mellitus, type 2        Lab Results   Component Value Date     05/19/2023    K 4.2 05/19/2023     05/19/2023    CO2 23 05/19/2023    BUN 17 05/19/2023    CREATININE 1.1 05/19/2023    ANIONGAP 9 05/19/2023     Lab Results   Component Value Date    HGBA1C 5.6 08/14/2023     No results found for: "BNP", "BNPTRIAGEBLO"    Lab Results   Component Value Date    WBC 5.88 05/19/2023    HGB 15.7 05/19/2023    HCT 48.9 05/19/2023     05/19/2023    GRAN 3.3 05/19/2023    GRAN 55.6 05/19/2023     Lab Results   Component Value Date    CHOL 198 08/14/2023    HDL 51 08/14/2023    LDLCALC 133.2 08/14/2023    TRIG 69 08/14/2023        Outpatient Encounter Medications as of 12/6/2024   Medication Sig Dispense Refill    semaglutide (OZEMPIC) 0.25 mg or 0.5 mg (2 mg/3 mL) pen injector Inject 0.25 mg into the skin every 7 days for 28 days, THEN 0.5 mg every 7 days. 6 mL 0    [START ON 2/18/2025] semaglutide (OZEMPIC) 1 mg/dose (4 mg/3 mL) Inject 1 mg into the skin every 7 days. 3 mL 11    [DISCONTINUED] atorvastatin (LIPITOR) 10 MG tablet 10 mg once daily. (Patient " not taking: Reported on 12/6/2024)      [DISCONTINUED] blood sugar diagnostic Strp To check BG 2 times daily, to use with insurance preferred meter (Patient not taking: Reported on 12/6/2024) 100 strip 5    [DISCONTINUED] blood-glucose meter kit To check BG 2 times daily, to use with insurance preferred meter 1 each 0    [DISCONTINUED] empagliflozin (JARDIANCE) 10 mg tablet Take 1 tablet (10 mg total) by mouth once daily. (Patient not taking: Reported on 12/6/2024) 90 tablet 3    [DISCONTINUED] lancets Misc To check BG 2 times daily, to use with insurance preferred meter (Patient not taking: Reported on 12/6/2024) 100 each 5    [DISCONTINUED] losartan (COZAAR) 100 MG tablet Take 1 tablet (100 mg total) by mouth once daily. (Patient not taking: Reported on 12/6/2024) 90 tablet 3    [DISCONTINUED] metFORMIN (GLUCOPHAGE) 500 MG tablet Take 1 tablet (500 mg total) by mouth daily with breakfast. (Patient not taking: Reported on 12/6/2024) 90 tablet 3    [DISCONTINUED] TRUEPLUS LANCETS 33 gauge Misc Apply topically 2 (two) times daily. (Patient not taking: Reported on 12/6/2024)       No facility-administered encounter medications on file as of 12/6/2024.

## 2025-02-18 ENCOUNTER — PATIENT OUTREACH (OUTPATIENT)
Dept: ADMINISTRATIVE | Facility: HOSPITAL | Age: 25
End: 2025-02-18
Payer: MEDICAID

## 2025-02-18 NOTE — PROGRESS NOTES
Health Maintenance Due   Topic Date Due    Pneumococcal Vaccines (Age 0-49) (1 of 2 - PCV) 04/06/2017    Diabetes Urine Screening  02/13/2024    Hemoglobin A1c  02/14/2024    Lipid Panel  08/14/2024    Influenza Vaccine (1) 09/01/2024    COVID-19 Vaccine (3 - 2024-25 season) 09/01/2024    TETANUS VACCINE  11/12/2024     Immunizations - reviewed and updated   Care Everywhere - triggered   Care Teams -   Outreach - A1C Gap reviewed. Diabetic labs due. Called and spoke with patient, fasting lab appointment scheduled for 2/21/2025. Patient verbalized understanding

## 2025-02-21 ENCOUNTER — RESULTS FOLLOW-UP (OUTPATIENT)
Dept: PRIMARY CARE CLINIC | Facility: CLINIC | Age: 25
End: 2025-02-21
Payer: MEDICAID